# Patient Record
Sex: MALE | Race: WHITE | Employment: FULL TIME | URBAN - METROPOLITAN AREA
[De-identification: names, ages, dates, MRNs, and addresses within clinical notes are randomized per-mention and may not be internally consistent; named-entity substitution may affect disease eponyms.]

---

## 2017-03-23 ENCOUNTER — RECORDS - HEALTHEAST (OUTPATIENT)
Dept: LAB | Facility: CLINIC | Age: 57
End: 2017-03-23

## 2017-03-23 LAB
CHOLEST SERPL-MCNC: 141 MG/DL
FASTING STATUS PATIENT QL REPORTED: ABNORMAL
HDLC SERPL-MCNC: 38 MG/DL
LDLC SERPL CALC-MCNC: 82 MG/DL
PSA SERPL-MCNC: 0.8 NG/ML (ref 0–3.5)
TRIGL SERPL-MCNC: 103 MG/DL

## 2018-03-19 ENCOUNTER — SURGERY - HEALTHEAST (OUTPATIENT)
Dept: CARDIOLOGY | Facility: CLINIC | Age: 58
End: 2018-03-19

## 2018-03-19 ASSESSMENT — MIFFLIN-ST. JEOR: SCORE: 2014.21

## 2018-03-20 ASSESSMENT — MIFFLIN-ST. JEOR: SCORE: 2035.98

## 2018-03-21 ASSESSMENT — MIFFLIN-ST. JEOR: SCORE: 1995.61

## 2018-03-22 ASSESSMENT — MIFFLIN-ST. JEOR: SCORE: 1986.54

## 2018-03-26 ENCOUNTER — RECORDS - HEALTHEAST (OUTPATIENT)
Dept: LAB | Facility: CLINIC | Age: 58
End: 2018-03-26

## 2018-03-26 LAB
ANION GAP SERPL CALCULATED.3IONS-SCNC: 14 MMOL/L (ref 5–18)
BUN SERPL-MCNC: 13 MG/DL (ref 8–22)
CALCIUM SERPL-MCNC: 9.3 MG/DL (ref 8.5–10.5)
CHLORIDE BLD-SCNC: 102 MMOL/L (ref 98–107)
CO2 SERPL-SCNC: 20 MMOL/L (ref 22–31)
CREAT SERPL-MCNC: 0.77 MG/DL (ref 0.7–1.3)
GFR SERPL CREATININE-BSD FRML MDRD: >60 ML/MIN/1.73M2
GLUCOSE BLD-MCNC: 123 MG/DL (ref 70–125)
POTASSIUM BLD-SCNC: 4.6 MMOL/L (ref 3.5–5)
SODIUM SERPL-SCNC: 136 MMOL/L (ref 136–145)

## 2018-04-02 ENCOUNTER — RECORDS - HEALTHEAST (OUTPATIENT)
Dept: ADMINISTRATIVE | Facility: OTHER | Age: 58
End: 2018-04-02

## 2018-04-02 ENCOUNTER — AMBULATORY - HEALTHEAST (OUTPATIENT)
Dept: CARDIOLOGY | Facility: CLINIC | Age: 58
End: 2018-04-02

## 2018-04-04 ENCOUNTER — OFFICE VISIT - HEALTHEAST (OUTPATIENT)
Dept: CARDIOLOGY | Facility: CLINIC | Age: 58
End: 2018-04-04

## 2018-04-04 ENCOUNTER — COMMUNICATION - HEALTHEAST (OUTPATIENT)
Dept: CARDIOLOGY | Facility: CLINIC | Age: 58
End: 2018-04-04

## 2018-04-04 DIAGNOSIS — E66.9 OBESITY (BMI 30-39.9): ICD-10-CM

## 2018-04-04 DIAGNOSIS — R73.9 HYPERGLYCEMIA: ICD-10-CM

## 2018-04-04 DIAGNOSIS — Z95.1 S/P CABG X 4: ICD-10-CM

## 2018-04-04 DIAGNOSIS — I21.19 ACUTE MI, INFERIOR WALL, INITIAL EPISODE OF CARE (H): ICD-10-CM

## 2018-04-04 DIAGNOSIS — E78.5 DYSLIPIDEMIA, GOAL LDL BELOW 70: ICD-10-CM

## 2018-04-04 RX ORDER — CYCLOBENZAPRINE HCL 10 MG
10 TABLET ORAL 3 TIMES DAILY PRN
Refills: 0 | Status: SHIPPED | COMMUNITY
Start: 2018-03-29

## 2018-04-04 RX ORDER — METOPROLOL SUCCINATE 50 MG/1
50 TABLET, EXTENDED RELEASE ORAL DAILY
Qty: 30 TABLET | Refills: 0 | Status: SHIPPED
Start: 2018-04-04 | End: 2018-05-04

## 2018-04-04 ASSESSMENT — MIFFLIN-ST. JEOR: SCORE: 1977.92

## 2018-04-05 ENCOUNTER — AMBULATORY - HEALTHEAST (OUTPATIENT)
Dept: CARDIAC REHAB | Facility: HOSPITAL | Age: 58
End: 2018-04-05

## 2018-04-05 DIAGNOSIS — I21.3 STEMI (ST ELEVATION MYOCARDIAL INFARCTION) (H): ICD-10-CM

## 2018-04-05 DIAGNOSIS — Z95.5 STENTED CORONARY ARTERY: ICD-10-CM

## 2018-04-06 ENCOUNTER — AMBULATORY - HEALTHEAST (OUTPATIENT)
Dept: CARDIAC REHAB | Facility: HOSPITAL | Age: 58
End: 2018-04-06

## 2018-04-06 DIAGNOSIS — Z95.5 STENTED CORONARY ARTERY: ICD-10-CM

## 2018-04-06 DIAGNOSIS — I21.11 ST ELEVATION MYOCARDIAL INFARCTION INVOLVING RIGHT CORONARY ARTERY (H): ICD-10-CM

## 2018-04-09 ENCOUNTER — AMBULATORY - HEALTHEAST (OUTPATIENT)
Dept: CARDIAC REHAB | Facility: HOSPITAL | Age: 58
End: 2018-04-09

## 2018-04-09 DIAGNOSIS — I21.11 ST ELEVATION MYOCARDIAL INFARCTION INVOLVING RIGHT CORONARY ARTERY (H): ICD-10-CM

## 2018-04-09 DIAGNOSIS — Z95.5 STENTED CORONARY ARTERY: ICD-10-CM

## 2018-04-10 ENCOUNTER — COMMUNICATION - HEALTHEAST (OUTPATIENT)
Dept: CARDIOLOGY | Facility: CLINIC | Age: 58
End: 2018-04-10

## 2018-04-11 ENCOUNTER — AMBULATORY - HEALTHEAST (OUTPATIENT)
Dept: CARDIAC REHAB | Facility: HOSPITAL | Age: 58
End: 2018-04-11

## 2018-04-11 ENCOUNTER — COMMUNICATION - HEALTHEAST (OUTPATIENT)
Dept: CARDIOLOGY | Facility: CLINIC | Age: 58
End: 2018-04-11

## 2018-04-11 DIAGNOSIS — Z95.5 STENTED CORONARY ARTERY: ICD-10-CM

## 2018-04-11 DIAGNOSIS — I21.11 ST ELEVATION MYOCARDIAL INFARCTION INVOLVING RIGHT CORONARY ARTERY (H): ICD-10-CM

## 2018-04-13 ENCOUNTER — AMBULATORY - HEALTHEAST (OUTPATIENT)
Dept: CARDIAC REHAB | Facility: HOSPITAL | Age: 58
End: 2018-04-13

## 2018-04-13 DIAGNOSIS — I21.11 ST ELEVATION MYOCARDIAL INFARCTION INVOLVING RIGHT CORONARY ARTERY (H): ICD-10-CM

## 2018-04-13 DIAGNOSIS — Z95.5 STENTED CORONARY ARTERY: ICD-10-CM

## 2018-04-16 ENCOUNTER — AMBULATORY - HEALTHEAST (OUTPATIENT)
Dept: CARDIAC REHAB | Facility: HOSPITAL | Age: 58
End: 2018-04-16

## 2018-04-16 DIAGNOSIS — Z95.5 STENTED CORONARY ARTERY: ICD-10-CM

## 2018-04-18 ENCOUNTER — AMBULATORY - HEALTHEAST (OUTPATIENT)
Dept: CARDIAC REHAB | Facility: HOSPITAL | Age: 58
End: 2018-04-18

## 2018-04-18 DIAGNOSIS — Z95.5 STENTED CORONARY ARTERY: ICD-10-CM

## 2018-04-18 DIAGNOSIS — I21.11 ST ELEVATION MYOCARDIAL INFARCTION INVOLVING RIGHT CORONARY ARTERY (H): ICD-10-CM

## 2018-04-19 ENCOUNTER — AMBULATORY - HEALTHEAST (OUTPATIENT)
Dept: CARDIOLOGY | Facility: CLINIC | Age: 58
End: 2018-04-19

## 2018-04-19 ENCOUNTER — RECORDS - HEALTHEAST (OUTPATIENT)
Dept: ADMINISTRATIVE | Facility: OTHER | Age: 58
End: 2018-04-19

## 2018-04-20 ENCOUNTER — AMBULATORY - HEALTHEAST (OUTPATIENT)
Dept: CARDIOLOGY | Facility: CLINIC | Age: 58
End: 2018-04-20

## 2018-04-20 ENCOUNTER — COMMUNICATION - HEALTHEAST (OUTPATIENT)
Dept: CARDIOLOGY | Facility: CLINIC | Age: 58
End: 2018-04-20

## 2018-04-20 ENCOUNTER — OFFICE VISIT - HEALTHEAST (OUTPATIENT)
Dept: CARDIOLOGY | Facility: CLINIC | Age: 58
End: 2018-04-20

## 2018-04-20 DIAGNOSIS — E78.5 DYSLIPIDEMIA, GOAL LDL BELOW 70: ICD-10-CM

## 2018-04-20 DIAGNOSIS — I25.10 CAD (CORONARY ARTERY DISEASE): ICD-10-CM

## 2018-04-20 DIAGNOSIS — Z95.1 S/P CABG X 4: ICD-10-CM

## 2018-04-20 LAB
ATRIAL RATE - MUSE: 81 BPM
DIASTOLIC BLOOD PRESSURE - MUSE: NORMAL MMHG
INTERPRETATION ECG - MUSE: NORMAL
P AXIS - MUSE: 16 DEGREES
PR INTERVAL - MUSE: 144 MS
QRS DURATION - MUSE: 90 MS
QT - MUSE: 360 MS
QTC - MUSE: 418 MS
R AXIS - MUSE: 2 DEGREES
SYSTOLIC BLOOD PRESSURE - MUSE: NORMAL MMHG
T AXIS - MUSE: 65 DEGREES
VENTRICULAR RATE- MUSE: 81 BPM

## 2018-04-20 ASSESSMENT — MIFFLIN-ST. JEOR: SCORE: 1939.82

## 2018-04-23 ENCOUNTER — AMBULATORY - HEALTHEAST (OUTPATIENT)
Dept: CARDIAC REHAB | Facility: HOSPITAL | Age: 58
End: 2018-04-23

## 2018-04-23 DIAGNOSIS — Z95.5 STENTED CORONARY ARTERY: ICD-10-CM

## 2018-04-23 DIAGNOSIS — I21.11 ST ELEVATION MYOCARDIAL INFARCTION INVOLVING RIGHT CORONARY ARTERY (H): ICD-10-CM

## 2018-04-25 ENCOUNTER — COMMUNICATION - HEALTHEAST (OUTPATIENT)
Dept: CARDIOLOGY | Facility: CLINIC | Age: 58
End: 2018-04-25

## 2018-04-25 ENCOUNTER — AMBULATORY - HEALTHEAST (OUTPATIENT)
Dept: CARDIAC REHAB | Facility: HOSPITAL | Age: 58
End: 2018-04-25

## 2018-04-25 DIAGNOSIS — Z95.5 STENTED CORONARY ARTERY: ICD-10-CM

## 2018-04-27 ENCOUNTER — AMBULATORY - HEALTHEAST (OUTPATIENT)
Dept: CARDIAC REHAB | Facility: HOSPITAL | Age: 58
End: 2018-04-27

## 2018-04-27 DIAGNOSIS — I21.11 ST ELEVATION MYOCARDIAL INFARCTION INVOLVING RIGHT CORONARY ARTERY (H): ICD-10-CM

## 2018-04-27 DIAGNOSIS — Z95.5 STENTED CORONARY ARTERY: ICD-10-CM

## 2018-04-30 ENCOUNTER — AMBULATORY - HEALTHEAST (OUTPATIENT)
Dept: CARDIAC REHAB | Facility: HOSPITAL | Age: 58
End: 2018-04-30

## 2018-04-30 DIAGNOSIS — Z95.5 STENTED CORONARY ARTERY: ICD-10-CM

## 2018-04-30 DIAGNOSIS — I21.11 ST ELEVATION MYOCARDIAL INFARCTION INVOLVING RIGHT CORONARY ARTERY (H): ICD-10-CM

## 2018-05-02 ENCOUNTER — AMBULATORY - HEALTHEAST (OUTPATIENT)
Dept: CARDIAC REHAB | Facility: HOSPITAL | Age: 58
End: 2018-05-02

## 2018-05-02 DIAGNOSIS — Z95.5 STENTED CORONARY ARTERY: ICD-10-CM

## 2018-05-02 DIAGNOSIS — I21.11 ST ELEVATION MYOCARDIAL INFARCTION INVOLVING RIGHT CORONARY ARTERY (H): ICD-10-CM

## 2018-05-04 ENCOUNTER — AMBULATORY - HEALTHEAST (OUTPATIENT)
Dept: CARDIAC REHAB | Facility: HOSPITAL | Age: 58
End: 2018-05-04

## 2018-05-04 DIAGNOSIS — I21.11 ST ELEVATION MYOCARDIAL INFARCTION INVOLVING RIGHT CORONARY ARTERY (H): ICD-10-CM

## 2018-05-04 DIAGNOSIS — Z95.5 STENTED CORONARY ARTERY: ICD-10-CM

## 2018-05-07 ENCOUNTER — AMBULATORY - HEALTHEAST (OUTPATIENT)
Dept: CARDIAC REHAB | Facility: HOSPITAL | Age: 58
End: 2018-05-07

## 2018-05-07 DIAGNOSIS — I21.11 ST ELEVATION MYOCARDIAL INFARCTION INVOLVING RIGHT CORONARY ARTERY (H): ICD-10-CM

## 2018-05-07 DIAGNOSIS — Z95.5 STENTED CORONARY ARTERY: ICD-10-CM

## 2018-05-09 ENCOUNTER — AMBULATORY - HEALTHEAST (OUTPATIENT)
Dept: CARDIAC REHAB | Facility: HOSPITAL | Age: 58
End: 2018-05-09

## 2018-05-09 DIAGNOSIS — Z95.5 STENTED CORONARY ARTERY: ICD-10-CM

## 2018-05-09 DIAGNOSIS — I21.11 ST ELEVATION MYOCARDIAL INFARCTION INVOLVING RIGHT CORONARY ARTERY (H): ICD-10-CM

## 2018-05-11 ENCOUNTER — AMBULATORY - HEALTHEAST (OUTPATIENT)
Dept: CARDIAC REHAB | Facility: HOSPITAL | Age: 58
End: 2018-05-11

## 2018-05-11 DIAGNOSIS — I21.11 ST ELEVATION MYOCARDIAL INFARCTION INVOLVING RIGHT CORONARY ARTERY (H): ICD-10-CM

## 2018-05-11 DIAGNOSIS — Z95.5 STENTED CORONARY ARTERY: ICD-10-CM

## 2018-05-29 ENCOUNTER — RECORDS - HEALTHEAST (OUTPATIENT)
Dept: ADMINISTRATIVE | Facility: OTHER | Age: 58
End: 2018-05-29

## 2018-05-30 ENCOUNTER — OFFICE VISIT - HEALTHEAST (OUTPATIENT)
Dept: CARDIOLOGY | Facility: CLINIC | Age: 58
End: 2018-05-30

## 2018-05-30 DIAGNOSIS — I21.19 ACUTE MI, INFERIOR WALL, INITIAL EPISODE OF CARE (H): ICD-10-CM

## 2018-05-30 DIAGNOSIS — I21.11 ST ELEVATION MYOCARDIAL INFARCTION INVOLVING RIGHT CORONARY ARTERY (H): ICD-10-CM

## 2018-05-30 DIAGNOSIS — E78.5 DYSLIPIDEMIA, GOAL LDL BELOW 70: ICD-10-CM

## 2018-05-30 DIAGNOSIS — I25.709 CORONARY ARTERY DISEASE INVOLVING CORONARY BYPASS GRAFT OF NATIVE HEART WITH ANGINA PECTORIS (H): ICD-10-CM

## 2018-05-30 DIAGNOSIS — Z95.1 S/P CABG X 4: ICD-10-CM

## 2018-05-30 ASSESSMENT — MIFFLIN-ST. JEOR: SCORE: 1909.88

## 2018-05-31 ENCOUNTER — COMMUNICATION - HEALTHEAST (OUTPATIENT)
Dept: CARDIOLOGY | Facility: CLINIC | Age: 58
End: 2018-05-31

## 2018-06-06 ENCOUNTER — AMBULATORY - HEALTHEAST (OUTPATIENT)
Dept: CARDIOLOGY | Facility: CLINIC | Age: 58
End: 2018-06-06

## 2018-06-06 DIAGNOSIS — I21.19 ACUTE MI, INFERIOR WALL, INITIAL EPISODE OF CARE (H): ICD-10-CM

## 2018-06-06 LAB
ALT SERPL W P-5'-P-CCNC: 28 U/L (ref 0–45)
ANION GAP SERPL CALCULATED.3IONS-SCNC: 11 MMOL/L (ref 5–18)
AST SERPL W P-5'-P-CCNC: 22 U/L (ref 0–40)
BUN SERPL-MCNC: 17 MG/DL (ref 8–22)
CALCIUM SERPL-MCNC: 9.3 MG/DL (ref 8.5–10.5)
CHLORIDE BLD-SCNC: 106 MMOL/L (ref 98–107)
CO2 SERPL-SCNC: 23 MMOL/L (ref 22–31)
CREAT SERPL-MCNC: 0.66 MG/DL (ref 0.7–1.3)
GFR SERPL CREATININE-BSD FRML MDRD: >60 ML/MIN/1.73M2
GLUCOSE BLD-MCNC: 126 MG/DL (ref 70–125)
POTASSIUM BLD-SCNC: 4.4 MMOL/L (ref 3.5–5)
SODIUM SERPL-SCNC: 140 MMOL/L (ref 136–145)

## 2018-08-27 ENCOUNTER — RECORDS - HEALTHEAST (OUTPATIENT)
Dept: ADMINISTRATIVE | Facility: OTHER | Age: 58
End: 2018-08-27

## 2018-08-27 ENCOUNTER — AMBULATORY - HEALTHEAST (OUTPATIENT)
Dept: CARDIOLOGY | Facility: CLINIC | Age: 58
End: 2018-08-27

## 2018-08-31 ENCOUNTER — OFFICE VISIT - HEALTHEAST (OUTPATIENT)
Dept: CARDIOLOGY | Facility: CLINIC | Age: 58
End: 2018-08-31

## 2018-08-31 DIAGNOSIS — Z95.1 S/P CABG X 4: ICD-10-CM

## 2018-08-31 DIAGNOSIS — I25.10 CAD (CORONARY ARTERY DISEASE): ICD-10-CM

## 2018-08-31 DIAGNOSIS — E78.5 DYSLIPIDEMIA, GOAL LDL BELOW 70: ICD-10-CM

## 2018-08-31 DIAGNOSIS — I25.709 CORONARY ARTERY DISEASE INVOLVING CORONARY BYPASS GRAFT OF NATIVE HEART WITH ANGINA PECTORIS (H): ICD-10-CM

## 2018-08-31 ASSESSMENT — MIFFLIN-ST. JEOR: SCORE: 1941.07

## 2018-09-17 ENCOUNTER — RECORDS - HEALTHEAST (OUTPATIENT)
Dept: LAB | Facility: CLINIC | Age: 58
End: 2018-09-17

## 2018-09-17 LAB
ANION GAP SERPL CALCULATED.3IONS-SCNC: 11 MMOL/L (ref 5–18)
BUN SERPL-MCNC: 21 MG/DL (ref 8–22)
CALCIUM SERPL-MCNC: 9.4 MG/DL (ref 8.5–10.5)
CHLORIDE BLD-SCNC: 106 MMOL/L (ref 98–107)
CO2 SERPL-SCNC: 23 MMOL/L (ref 22–31)
CREAT SERPL-MCNC: 0.71 MG/DL (ref 0.7–1.3)
GFR SERPL CREATININE-BSD FRML MDRD: >60 ML/MIN/1.73M2
GLUCOSE BLD-MCNC: 122 MG/DL (ref 70–125)
POTASSIUM BLD-SCNC: 4.5 MMOL/L (ref 3.5–5)
PSA SERPL-MCNC: 0.4 NG/ML (ref 0–3.5)
SODIUM SERPL-SCNC: 140 MMOL/L (ref 136–145)

## 2018-09-18 ENCOUNTER — AMBULATORY - HEALTHEAST (OUTPATIENT)
Dept: CARDIOLOGY | Facility: CLINIC | Age: 58
End: 2018-09-18

## 2018-09-20 ENCOUNTER — COMMUNICATION - HEALTHEAST (OUTPATIENT)
Dept: CARDIOLOGY | Facility: CLINIC | Age: 58
End: 2018-09-20

## 2018-09-20 DIAGNOSIS — I25.10 CAD (CORONARY ARTERY DISEASE): ICD-10-CM

## 2018-09-21 ENCOUNTER — HOSPITAL ENCOUNTER (OUTPATIENT)
Dept: CARDIOLOGY | Facility: HOSPITAL | Age: 58
Discharge: HOME | End: 2018-09-21
Attending: INTERNAL MEDICINE

## 2018-09-21 DIAGNOSIS — I25.10 CAD (CORONARY ARTERY DISEASE): ICD-10-CM

## 2018-09-21 LAB
CV STRESS CURRENT BP HE: NORMAL
CV STRESS CURRENT HR HE: 101
CV STRESS CURRENT HR HE: 105
CV STRESS CURRENT HR HE: 105
CV STRESS CURRENT HR HE: 57
CV STRESS CURRENT HR HE: 58
CV STRESS CURRENT HR HE: 59
CV STRESS CURRENT HR HE: 60
CV STRESS CURRENT HR HE: 62
CV STRESS CURRENT HR HE: 62
CV STRESS CURRENT HR HE: 65
CV STRESS CURRENT HR HE: 70
CV STRESS CURRENT HR HE: 72
CV STRESS CURRENT HR HE: 73
CV STRESS CURRENT HR HE: 76
CV STRESS CURRENT HR HE: 77
CV STRESS CURRENT HR HE: 80
CV STRESS CURRENT HR HE: 82
CV STRESS CURRENT HR HE: 83
CV STRESS CURRENT HR HE: 85
CV STRESS CURRENT HR HE: 87
CV STRESS CURRENT HR HE: 95
CV STRESS CURRENT HR HE: 97
CV STRESS DEVIATION TIME HE: NORMAL
CV STRESS ECHO PERCENT HR HE: NORMAL
CV STRESS EXERCISE STAGE HE: NORMAL
CV STRESS FINAL RESTING BP HE: NORMAL
CV STRESS FINAL RESTING HR HE: 60
CV STRESS MAX HR HE: 107
CV STRESS MAX TREADMILL GRADE HE: 14
CV STRESS MAX TREADMILL SPEED HE: 3.4
CV STRESS PEAK DIA BP HE: NORMAL
CV STRESS PEAK SYS BP HE: NORMAL
CV STRESS PHASE HE: NORMAL
CV STRESS PROTOCOL HE: NORMAL
CV STRESS RESTING PT POSITION HE: NORMAL
CV STRESS RESTING PT POSITION HE: NORMAL
CV STRESS ST DEVIATION AMOUNT HE: NORMAL
CV STRESS ST DEVIATION ELEVATION HE: NORMAL
CV STRESS ST EVELATION AMOUNT HE: NORMAL
CV STRESS TEST TYPE HE: NORMAL
CV STRESS TOTAL STAGE TIME MIN 1 HE: NORMAL
ECHO EJECTION FRACTION ESTIMATED: 55 %
STRESS ECHO BASELINE BP: NORMAL
STRESS ECHO BASELINE HR: 70
STRESS ECHO CALCULATED PERCENT HR: 65 %
STRESS ECHO LAST STRESS BP: NORMAL
STRESS ECHO LAST STRESS HR: 105
STRESS ECHO POST ESTIMATED WORKLOAD: 8.1
STRESS ECHO POST EXERCISE DUR MIN: 6
STRESS ECHO POST EXERCISE DUR SEC: 46
STRESS ECHO TARGET HR: 139

## 2018-09-25 ENCOUNTER — AMBULATORY - HEALTHEAST (OUTPATIENT)
Dept: CARDIOLOGY | Facility: CLINIC | Age: 58
End: 2018-09-25

## 2018-09-25 DIAGNOSIS — I25.10 CAD (CORONARY ARTERY DISEASE): ICD-10-CM

## 2018-09-25 LAB
ALBUMIN SERPL-MCNC: 4.2 G/DL (ref 3.5–5)
ALP SERPL-CCNC: 69 U/L (ref 45–120)
ALT SERPL W P-5'-P-CCNC: 26 U/L (ref 0–45)
AST SERPL W P-5'-P-CCNC: 20 U/L (ref 0–40)
BILIRUB DIRECT SERPL-MCNC: 0.2 MG/DL
BILIRUB SERPL-MCNC: 0.8 MG/DL (ref 0–1)
CHOLEST SERPL-MCNC: 160 MG/DL
FASTING STATUS PATIENT QL REPORTED: YES
HDLC SERPL-MCNC: 41 MG/DL
LDLC SERPL CALC-MCNC: 88 MG/DL
PROT SERPL-MCNC: 7.7 G/DL (ref 6–8)
TRIGL SERPL-MCNC: 153 MG/DL

## 2018-10-15 ENCOUNTER — COMMUNICATION - HEALTHEAST (OUTPATIENT)
Dept: CARDIOLOGY | Facility: CLINIC | Age: 58
End: 2018-10-15

## 2018-11-15 ENCOUNTER — COMMUNICATION - HEALTHEAST (OUTPATIENT)
Dept: CARDIOLOGY | Facility: CLINIC | Age: 58
End: 2018-11-15

## 2018-11-15 DIAGNOSIS — E78.5 DYSLIPIDEMIA, GOAL LDL BELOW 70: ICD-10-CM

## 2019-01-07 ENCOUNTER — AMBULATORY - HEALTHEAST (OUTPATIENT)
Dept: CARDIOLOGY | Facility: CLINIC | Age: 59
End: 2019-01-07

## 2019-01-07 ENCOUNTER — RECORDS - HEALTHEAST (OUTPATIENT)
Dept: ADMINISTRATIVE | Facility: OTHER | Age: 59
End: 2019-01-07

## 2019-01-07 ENCOUNTER — COMMUNICATION - HEALTHEAST (OUTPATIENT)
Dept: CARDIOLOGY | Facility: CLINIC | Age: 59
End: 2019-01-07

## 2019-01-07 DIAGNOSIS — E78.5 DYSLIPIDEMIA, GOAL LDL BELOW 70: ICD-10-CM

## 2019-01-07 DIAGNOSIS — I25.709 CORONARY ARTERY DISEASE INVOLVING CORONARY BYPASS GRAFT OF NATIVE HEART WITH ANGINA PECTORIS (H): ICD-10-CM

## 2019-01-08 ENCOUNTER — RECORDS - HEALTHEAST (OUTPATIENT)
Dept: LAB | Facility: CLINIC | Age: 59
End: 2019-01-08

## 2019-01-08 LAB
ALBUMIN SERPL-MCNC: 4.1 G/DL (ref 3.5–5)
ALP SERPL-CCNC: 66 U/L (ref 45–120)
ALT SERPL W P-5'-P-CCNC: 39 U/L (ref 0–45)
ANION GAP SERPL CALCULATED.3IONS-SCNC: 9 MMOL/L (ref 5–18)
AST SERPL W P-5'-P-CCNC: 30 U/L (ref 0–40)
BILIRUB SERPL-MCNC: 0.7 MG/DL (ref 0–1)
BUN SERPL-MCNC: 20 MG/DL (ref 8–22)
CALCIUM SERPL-MCNC: 8.9 MG/DL (ref 8.5–10.5)
CHLORIDE BLD-SCNC: 107 MMOL/L (ref 98–107)
CHOLEST SERPL-MCNC: 150 MG/DL
CO2 SERPL-SCNC: 24 MMOL/L (ref 22–31)
CREAT SERPL-MCNC: 0.67 MG/DL (ref 0.7–1.3)
FASTING STATUS PATIENT QL REPORTED: YES
GFR SERPL CREATININE-BSD FRML MDRD: >60 ML/MIN/1.73M2
GLUCOSE BLD-MCNC: 123 MG/DL (ref 70–125)
HDLC SERPL-MCNC: 37 MG/DL
LDLC SERPL CALC-MCNC: 81 MG/DL
POTASSIUM BLD-SCNC: 4.2 MMOL/L (ref 3.5–5)
PROT SERPL-MCNC: 7.4 G/DL (ref 6–8)
SODIUM SERPL-SCNC: 140 MMOL/L (ref 136–145)
TRIGL SERPL-MCNC: 160 MG/DL

## 2019-01-10 ENCOUNTER — AMBULATORY - HEALTHEAST (OUTPATIENT)
Dept: CARDIOLOGY | Facility: CLINIC | Age: 59
End: 2019-01-10

## 2019-01-11 ENCOUNTER — RECORDS - HEALTHEAST (OUTPATIENT)
Dept: LAB | Facility: CLINIC | Age: 59
End: 2019-01-11

## 2019-01-11 ENCOUNTER — OFFICE VISIT - HEALTHEAST (OUTPATIENT)
Dept: CARDIOLOGY | Facility: CLINIC | Age: 59
End: 2019-01-11

## 2019-01-11 DIAGNOSIS — Z95.1 S/P CABG X 4: ICD-10-CM

## 2019-01-11 DIAGNOSIS — I21.3 ST ELEVATION (STEMI) MYOCARDIAL INFARCTION (H): ICD-10-CM

## 2019-01-11 DIAGNOSIS — E78.5 DYSLIPIDEMIA, GOAL LDL BELOW 70: ICD-10-CM

## 2019-01-11 DIAGNOSIS — I25.709 CORONARY ARTERY DISEASE INVOLVING CORONARY BYPASS GRAFT OF NATIVE HEART WITH ANGINA PECTORIS (H): ICD-10-CM

## 2019-01-11 ASSESSMENT — MIFFLIN-ST. JEOR: SCORE: 1959.21

## 2019-01-13 LAB — BACTERIA SPEC CULT: NORMAL

## 2019-01-14 ENCOUNTER — COMMUNICATION - HEALTHEAST (OUTPATIENT)
Dept: CARDIOLOGY | Facility: CLINIC | Age: 59
End: 2019-01-14

## 2019-01-14 DIAGNOSIS — E78.5 DYSLIPIDEMIA, GOAL LDL BELOW 70: ICD-10-CM

## 2019-01-14 DIAGNOSIS — I25.709 CORONARY ARTERY DISEASE INVOLVING CORONARY BYPASS GRAFT OF NATIVE HEART WITH ANGINA PECTORIS (H): ICD-10-CM

## 2019-03-20 ENCOUNTER — COMMUNICATION - HEALTHEAST (OUTPATIENT)
Dept: CARDIOLOGY | Facility: CLINIC | Age: 59
End: 2019-03-20

## 2019-04-05 ENCOUNTER — AMBULATORY - HEALTHEAST (OUTPATIENT)
Dept: CARDIOLOGY | Facility: CLINIC | Age: 59
End: 2019-04-05

## 2019-04-05 DIAGNOSIS — I25.709 CORONARY ARTERY DISEASE INVOLVING CORONARY BYPASS GRAFT OF NATIVE HEART WITH ANGINA PECTORIS (H): ICD-10-CM

## 2019-04-05 DIAGNOSIS — E78.5 DYSLIPIDEMIA, GOAL LDL BELOW 70: ICD-10-CM

## 2019-04-05 LAB
ALT SERPL W P-5'-P-CCNC: 50 U/L (ref 0–45)
AST SERPL W P-5'-P-CCNC: 46 U/L (ref 0–40)
CHOLEST SERPL-MCNC: 160 MG/DL
FASTING STATUS PATIENT QL REPORTED: YES
HDLC SERPL-MCNC: 47 MG/DL
LDLC SERPL CALC-MCNC: 77 MG/DL
TRIGL SERPL-MCNC: 178 MG/DL

## 2019-04-11 ENCOUNTER — AMBULATORY - HEALTHEAST (OUTPATIENT)
Dept: CARDIOLOGY | Facility: CLINIC | Age: 59
End: 2019-04-11

## 2019-04-11 DIAGNOSIS — Z51.81 ENCOUNTER FOR MEDICATION MONITORING: ICD-10-CM

## 2019-04-11 DIAGNOSIS — E78.5 DYSLIPIDEMIA, GOAL LDL BELOW 70: ICD-10-CM

## 2019-05-17 ENCOUNTER — AMBULATORY - HEALTHEAST (OUTPATIENT)
Dept: CARDIOLOGY | Facility: CLINIC | Age: 59
End: 2019-05-17

## 2019-05-17 DIAGNOSIS — Z51.81 ENCOUNTER FOR MEDICATION MONITORING: ICD-10-CM

## 2019-05-17 DIAGNOSIS — E78.5 DYSLIPIDEMIA, GOAL LDL BELOW 70: ICD-10-CM

## 2019-05-17 LAB
ALT SERPL W P-5'-P-CCNC: 37 U/L (ref 0–45)
AST SERPL W P-5'-P-CCNC: 29 U/L (ref 0–40)

## 2019-05-21 ENCOUNTER — COMMUNICATION - HEALTHEAST (OUTPATIENT)
Dept: CARDIOLOGY | Facility: CLINIC | Age: 59
End: 2019-05-21

## 2019-08-30 ENCOUNTER — COMMUNICATION - HEALTHEAST (OUTPATIENT)
Dept: CARDIOLOGY | Facility: CLINIC | Age: 59
End: 2019-08-30

## 2019-08-30 DIAGNOSIS — I21.19 ACUTE MI, INFERIOR WALL, INITIAL EPISODE OF CARE (H): ICD-10-CM

## 2020-01-29 ENCOUNTER — COMMUNICATION - HEALTHEAST (OUTPATIENT)
Dept: CARDIOLOGY | Facility: CLINIC | Age: 60
End: 2020-01-29

## 2020-01-29 DIAGNOSIS — E78.5 DYSLIPIDEMIA, GOAL LDL BELOW 70: ICD-10-CM

## 2020-01-29 DIAGNOSIS — I25.709 CORONARY ARTERY DISEASE INVOLVING CORONARY BYPASS GRAFT OF NATIVE HEART WITH ANGINA PECTORIS (H): ICD-10-CM

## 2020-01-30 ENCOUNTER — COMMUNICATION - HEALTHEAST (OUTPATIENT)
Dept: CARDIOLOGY | Facility: CLINIC | Age: 60
End: 2020-01-30

## 2020-01-30 DIAGNOSIS — E78.5 DYSLIPIDEMIA, GOAL LDL BELOW 70: ICD-10-CM

## 2020-01-30 DIAGNOSIS — I25.709 CORONARY ARTERY DISEASE INVOLVING CORONARY BYPASS GRAFT OF NATIVE HEART WITH ANGINA PECTORIS (H): ICD-10-CM

## 2020-03-07 ENCOUNTER — COMMUNICATION - HEALTHEAST (OUTPATIENT)
Dept: CARDIOLOGY | Facility: CLINIC | Age: 60
End: 2020-03-07

## 2020-03-07 DIAGNOSIS — I21.19 ACUTE MI, INFERIOR WALL, INITIAL EPISODE OF CARE (H): ICD-10-CM

## 2020-03-11 ENCOUNTER — RECORDS - HEALTHEAST (OUTPATIENT)
Dept: ADMINISTRATIVE | Facility: OTHER | Age: 60
End: 2020-03-11

## 2020-03-11 ENCOUNTER — AMBULATORY - HEALTHEAST (OUTPATIENT)
Dept: CARDIOLOGY | Facility: CLINIC | Age: 60
End: 2020-03-11

## 2020-03-16 ENCOUNTER — OFFICE VISIT - HEALTHEAST (OUTPATIENT)
Dept: CARDIOLOGY | Facility: CLINIC | Age: 60
End: 2020-03-16

## 2020-03-16 DIAGNOSIS — I25.10 CAD (CORONARY ARTERY DISEASE): ICD-10-CM

## 2020-03-16 DIAGNOSIS — I25.709 CORONARY ARTERY DISEASE INVOLVING CORONARY BYPASS GRAFT OF NATIVE HEART WITH ANGINA PECTORIS (H): ICD-10-CM

## 2020-03-16 DIAGNOSIS — E78.5 DYSLIPIDEMIA, GOAL LDL BELOW 70: ICD-10-CM

## 2020-03-16 LAB
ALBUMIN SERPL-MCNC: 4.1 G/DL (ref 3.5–5)
ALP SERPL-CCNC: 80 U/L (ref 45–120)
ALT SERPL W P-5'-P-CCNC: 44 U/L (ref 0–45)
ANION GAP SERPL CALCULATED.3IONS-SCNC: 11 MMOL/L (ref 5–18)
AST SERPL W P-5'-P-CCNC: 31 U/L (ref 0–40)
BILIRUB SERPL-MCNC: 1 MG/DL (ref 0–1)
BUN SERPL-MCNC: 12 MG/DL (ref 8–22)
CALCIUM SERPL-MCNC: 9.2 MG/DL (ref 8.5–10.5)
CHLORIDE BLD-SCNC: 99 MMOL/L (ref 98–107)
CHOLEST SERPL-MCNC: 160 MG/DL
CO2 SERPL-SCNC: 25 MMOL/L (ref 22–31)
CREAT SERPL-MCNC: 0.8 MG/DL (ref 0.7–1.3)
FASTING STATUS PATIENT QL REPORTED: YES
GFR SERPL CREATININE-BSD FRML MDRD: >60 ML/MIN/1.73M2
GLUCOSE BLD-MCNC: 334 MG/DL (ref 70–125)
HBA1C MFR BLD: 13 %
HDLC SERPL-MCNC: 41 MG/DL
LDLC SERPL CALC-MCNC: 71 MG/DL
POTASSIUM BLD-SCNC: 4.7 MMOL/L (ref 3.5–5)
PROT SERPL-MCNC: 7.2 G/DL (ref 6–8)
SODIUM SERPL-SCNC: 135 MMOL/L (ref 136–145)
TRIGL SERPL-MCNC: 242 MG/DL

## 2020-03-16 RX ORDER — ASPIRIN 325 MG
325 TABLET ORAL DAILY
Status: SHIPPED | COMMUNITY
Start: 2020-03-16

## 2020-03-16 ASSESSMENT — MIFFLIN-ST. JEOR: SCORE: 1900.25

## 2020-03-19 ENCOUNTER — COMMUNICATION - HEALTHEAST (OUTPATIENT)
Dept: CARDIOLOGY | Facility: CLINIC | Age: 60
End: 2020-03-19

## 2020-04-26 ENCOUNTER — COMMUNICATION - HEALTHEAST (OUTPATIENT)
Dept: CARDIOLOGY | Facility: CLINIC | Age: 60
End: 2020-04-26

## 2020-04-26 DIAGNOSIS — E78.5 DYSLIPIDEMIA, GOAL LDL BELOW 70: ICD-10-CM

## 2020-04-26 DIAGNOSIS — I25.709 CORONARY ARTERY DISEASE INVOLVING CORONARY BYPASS GRAFT OF NATIVE HEART WITH ANGINA PECTORIS (H): ICD-10-CM

## 2020-07-06 ENCOUNTER — COMMUNICATION - HEALTHEAST (OUTPATIENT)
Dept: CARDIOLOGY | Facility: CLINIC | Age: 60
End: 2020-07-06

## 2020-07-06 DIAGNOSIS — I21.19 ACUTE MI, INFERIOR WALL, INITIAL EPISODE OF CARE (H): ICD-10-CM

## 2020-11-30 ENCOUNTER — RECORDS - HEALTHEAST (OUTPATIENT)
Dept: LAB | Facility: CLINIC | Age: 60
End: 2020-11-30

## 2020-11-30 LAB
ANION GAP SERPL CALCULATED.3IONS-SCNC: 10 MMOL/L (ref 5–18)
BUN SERPL-MCNC: 18 MG/DL (ref 8–22)
CALCIUM SERPL-MCNC: 9.6 MG/DL (ref 8.5–10.5)
CHLORIDE BLD-SCNC: 100 MMOL/L (ref 98–107)
CHOLEST SERPL-MCNC: 173 MG/DL
CO2 SERPL-SCNC: 28 MMOL/L (ref 22–31)
CREAT SERPL-MCNC: 0.72 MG/DL (ref 0.7–1.3)
FASTING STATUS PATIENT QL REPORTED: ABNORMAL
GFR SERPL CREATININE-BSD FRML MDRD: >60 ML/MIN/1.73M2
GLUCOSE BLD-MCNC: 127 MG/DL (ref 70–125)
HDLC SERPL-MCNC: 52 MG/DL
LDLC SERPL CALC-MCNC: 90 MG/DL
POTASSIUM BLD-SCNC: 4.2 MMOL/L (ref 3.5–5)
SODIUM SERPL-SCNC: 138 MMOL/L (ref 136–145)
TRIGL SERPL-MCNC: 157 MG/DL

## 2021-01-05 ENCOUNTER — COMMUNICATION - HEALTHEAST (OUTPATIENT)
Dept: CARDIOLOGY | Facility: CLINIC | Age: 61
End: 2021-01-05

## 2021-01-05 DIAGNOSIS — I21.19 ACUTE MI, INFERIOR WALL, INITIAL EPISODE OF CARE (H): ICD-10-CM

## 2021-01-26 ENCOUNTER — COMMUNICATION - HEALTHEAST (OUTPATIENT)
Dept: CARDIOLOGY | Facility: CLINIC | Age: 61
End: 2021-01-26

## 2021-01-26 DIAGNOSIS — E78.5 DYSLIPIDEMIA, GOAL LDL BELOW 70: ICD-10-CM

## 2021-01-26 DIAGNOSIS — I25.709 CORONARY ARTERY DISEASE INVOLVING CORONARY BYPASS GRAFT OF NATIVE HEART WITH ANGINA PECTORIS (H): ICD-10-CM

## 2021-01-26 RX ORDER — EZETIMIBE 10 MG/1
TABLET ORAL
Qty: 90 TABLET | Refills: 2 | Status: SHIPPED | OUTPATIENT
Start: 2021-01-26 | End: 2022-01-11

## 2021-02-05 ENCOUNTER — COMMUNICATION - HEALTHEAST (OUTPATIENT)
Dept: CARDIOLOGY | Facility: CLINIC | Age: 61
End: 2021-02-05

## 2021-02-05 DIAGNOSIS — E78.5 DYSLIPIDEMIA, GOAL LDL BELOW 70: ICD-10-CM

## 2021-02-05 DIAGNOSIS — I25.709 CORONARY ARTERY DISEASE INVOLVING CORONARY BYPASS GRAFT OF NATIVE HEART WITH ANGINA PECTORIS (H): ICD-10-CM

## 2021-02-05 RX ORDER — PRAVASTATIN SODIUM 80 MG/1
TABLET ORAL
Qty: 90 TABLET | Refills: 0 | Status: SHIPPED | OUTPATIENT
Start: 2021-02-05 | End: 2022-06-14

## 2021-03-02 ENCOUNTER — RECORDS - HEALTHEAST (OUTPATIENT)
Dept: ADMINISTRATIVE | Facility: OTHER | Age: 61
End: 2021-03-02

## 2021-03-02 ENCOUNTER — AMBULATORY - HEALTHEAST (OUTPATIENT)
Dept: CARDIOLOGY | Facility: CLINIC | Age: 61
End: 2021-03-02

## 2021-03-26 ENCOUNTER — OFFICE VISIT - HEALTHEAST (OUTPATIENT)
Dept: CARDIOLOGY | Facility: CLINIC | Age: 61
End: 2021-03-26

## 2021-03-26 DIAGNOSIS — Z95.1 S/P CABG X 4: ICD-10-CM

## 2021-03-26 DIAGNOSIS — I25.709 CORONARY ARTERY DISEASE INVOLVING CORONARY BYPASS GRAFT OF NATIVE HEART WITH ANGINA PECTORIS (H): ICD-10-CM

## 2021-03-26 DIAGNOSIS — I25.10 CAD (CORONARY ARTERY DISEASE): ICD-10-CM

## 2021-03-26 DIAGNOSIS — E78.5 DYSLIPIDEMIA, GOAL LDL BELOW 70: ICD-10-CM

## 2021-03-26 RX ORDER — BLOOD-GLUCOSE METER
EACH MISCELLANEOUS
Status: SHIPPED | COMMUNITY
Start: 2020-05-08 | End: 2022-01-17

## 2021-03-26 RX ORDER — LANCETS
EACH MISCELLANEOUS
Status: SHIPPED | COMMUNITY
Start: 2020-12-29

## 2021-03-26 RX ORDER — METFORMIN HCL 500 MG
1000 TABLET, EXTENDED RELEASE 24 HR ORAL 2 TIMES DAILY
Status: SHIPPED | COMMUNITY
Start: 2021-03-02

## 2021-03-26 ASSESSMENT — MIFFLIN-ST. JEOR: SCORE: 1897.53

## 2021-04-01 ENCOUNTER — COMMUNICATION - HEALTHEAST (OUTPATIENT)
Dept: CARDIOLOGY | Facility: CLINIC | Age: 61
End: 2021-04-01

## 2021-04-01 DIAGNOSIS — I21.19 ACUTE MI, INFERIOR WALL, INITIAL EPISODE OF CARE (H): ICD-10-CM

## 2021-04-01 RX ORDER — LISINOPRIL 5 MG/1
5 TABLET ORAL DAILY
Qty: 90 TABLET | Refills: 1 | Status: SHIPPED | OUTPATIENT
Start: 2021-04-01 | End: 2022-06-14

## 2021-04-08 ENCOUNTER — HOSPITAL ENCOUNTER (OUTPATIENT)
Dept: CARDIOLOGY | Facility: CLINIC | Age: 61
Discharge: HOME | End: 2021-04-08
Attending: INTERNAL MEDICINE

## 2021-04-08 DIAGNOSIS — I25.10 CAD (CORONARY ARTERY DISEASE): ICD-10-CM

## 2021-04-08 LAB
CV STRESS CURRENT BP HE: NORMAL
CV STRESS CURRENT HR HE: 101
CV STRESS CURRENT HR HE: 102
CV STRESS CURRENT HR HE: 102
CV STRESS CURRENT HR HE: 48
CV STRESS CURRENT HR HE: 49
CV STRESS CURRENT HR HE: 50
CV STRESS CURRENT HR HE: 51
CV STRESS CURRENT HR HE: 52
CV STRESS CURRENT HR HE: 54
CV STRESS CURRENT HR HE: 54
CV STRESS CURRENT HR HE: 55
CV STRESS CURRENT HR HE: 55
CV STRESS CURRENT HR HE: 57
CV STRESS CURRENT HR HE: 60
CV STRESS CURRENT HR HE: 61
CV STRESS CURRENT HR HE: 66
CV STRESS CURRENT HR HE: 70
CV STRESS CURRENT HR HE: 75
CV STRESS CURRENT HR HE: 77
CV STRESS CURRENT HR HE: 78
CV STRESS CURRENT HR HE: 79
CV STRESS CURRENT HR HE: 84
CV STRESS CURRENT HR HE: 85
CV STRESS CURRENT HR HE: 87
CV STRESS CURRENT HR HE: 89
CV STRESS DEVIATION TIME HE: NORMAL
CV STRESS ECHO PERCENT HR HE: NORMAL
CV STRESS EXERCISE STAGE HE: NORMAL
CV STRESS FINAL RESTING BP HE: NORMAL
CV STRESS FINAL RESTING HR HE: 50
CV STRESS MAX HR HE: 102
CV STRESS MAX TREADMILL GRADE HE: 14
CV STRESS MAX TREADMILL SPEED HE: 3.4
CV STRESS PEAK DIA BP HE: NORMAL
CV STRESS PEAK SYS BP HE: NORMAL
CV STRESS PHASE HE: NORMAL
CV STRESS PROTOCOL HE: NORMAL
CV STRESS RESTING PT POSITION HE: NORMAL
CV STRESS RESTING PT POSITION HE: NORMAL
CV STRESS ST DEVIATION AMOUNT HE: NORMAL
CV STRESS ST DEVIATION ELEVATION HE: NORMAL
CV STRESS ST EVELATION AMOUNT HE: NORMAL
CV STRESS TEST TYPE HE: NORMAL
CV STRESS TOTAL STAGE TIME MIN 1 HE: NORMAL
ECHO EJECTION FRACTION ESTIMATED: 60 %
RATE PRESSURE PRODUCT: NORMAL
STRESS ECHO BASELINE DIASTOLIC HE: 84
STRESS ECHO BASELINE HR: 62
STRESS ECHO BASELINE SYSTOLIC BP: 131
STRESS ECHO CALCULATED PERCENT HR: 63 %
STRESS ECHO LAST STRESS DIASTOLIC BP: 68
STRESS ECHO LAST STRESS HR: 102
STRESS ECHO LAST STRESS SYSTOLIC BP: 146
STRESS ECHO POST ESTIMATED WORKLOAD: 8.5
STRESS ECHO POST EXERCISE DUR MIN: 7
STRESS ECHO POST EXERCISE DUR SEC: 5
STRESS ECHO TARGET HR: 161

## 2021-04-13 ENCOUNTER — COMMUNICATION - HEALTHEAST (OUTPATIENT)
Dept: CARDIOLOGY | Facility: CLINIC | Age: 61
End: 2021-04-13

## 2021-04-13 DIAGNOSIS — I21.19 ACUTE MI, INFERIOR WALL, INITIAL EPISODE OF CARE (H): ICD-10-CM

## 2021-04-13 RX ORDER — METOPROLOL SUCCINATE 50 MG/1
50 TABLET, EXTENDED RELEASE ORAL DAILY
Qty: 90 TABLET | Refills: 1 | Status: SHIPPED | OUTPATIENT
Start: 2021-04-13 | End: 2022-06-14

## 2021-04-14 ENCOUNTER — AMBULATORY - HEALTHEAST (OUTPATIENT)
Dept: LAB | Facility: HOSPITAL | Age: 61
End: 2021-04-14

## 2021-04-14 DIAGNOSIS — I25.10 CAD (CORONARY ARTERY DISEASE): ICD-10-CM

## 2021-04-14 LAB
CHOLEST SERPL-MCNC: 149 MG/DL
FASTING STATUS PATIENT QL REPORTED: YES
HDLC SERPL-MCNC: 47 MG/DL
LDLC SERPL CALC-MCNC: 69 MG/DL
TRIGL SERPL-MCNC: 163 MG/DL

## 2021-06-01 ENCOUNTER — RECORDS - HEALTHEAST (OUTPATIENT)
Dept: ADMINISTRATIVE | Facility: CLINIC | Age: 61
End: 2021-06-01

## 2021-06-01 VITALS — BODY MASS INDEX: 38.62 KG/M2 | WEIGHT: 254 LBS

## 2021-06-01 VITALS — WEIGHT: 254 LBS | BODY MASS INDEX: 38.62 KG/M2

## 2021-06-01 VITALS — BODY MASS INDEX: 38.32 KG/M2 | WEIGHT: 252 LBS

## 2021-06-01 VITALS — WEIGHT: 263.9 LBS | HEIGHT: 68 IN | BODY MASS INDEX: 39.99 KG/M2

## 2021-06-01 VITALS — WEIGHT: 262 LBS | BODY MASS INDEX: 39.71 KG/M2 | HEIGHT: 68 IN

## 2021-06-01 VITALS — BODY MASS INDEX: 38.77 KG/M2 | WEIGHT: 255 LBS

## 2021-06-01 VITALS — HEIGHT: 68 IN | BODY MASS INDEX: 38.34 KG/M2 | WEIGHT: 253 LBS

## 2021-06-01 VITALS — BODY MASS INDEX: 38.01 KG/M2 | WEIGHT: 250 LBS

## 2021-06-01 VITALS — BODY MASS INDEX: 38.43 KG/M2 | WEIGHT: 253.6 LBS | HEIGHT: 68 IN

## 2021-06-01 VITALS — WEIGHT: 250 LBS | BODY MASS INDEX: 38.01 KG/M2

## 2021-06-01 VITALS — WEIGHT: 252 LBS | BODY MASS INDEX: 38.32 KG/M2

## 2021-06-01 VITALS — WEIGHT: 247 LBS | BODY MASS INDEX: 37.44 KG/M2 | HEIGHT: 68 IN

## 2021-06-01 VITALS — WEIGHT: 256 LBS | BODY MASS INDEX: 38.92 KG/M2

## 2021-06-02 VITALS — BODY MASS INDEX: 38.95 KG/M2 | HEIGHT: 68 IN | WEIGHT: 257 LBS

## 2021-06-04 ENCOUNTER — RECORDS - HEALTHEAST (OUTPATIENT)
Dept: LAB | Facility: CLINIC | Age: 61
End: 2021-06-04

## 2021-06-04 VITALS
HEIGHT: 68 IN | HEART RATE: 63 BPM | RESPIRATION RATE: 16 BRPM | DIASTOLIC BLOOD PRESSURE: 76 MMHG | WEIGHT: 244 LBS | SYSTOLIC BLOOD PRESSURE: 136 MMHG | BODY MASS INDEX: 36.98 KG/M2

## 2021-06-04 LAB
ANION GAP SERPL CALCULATED.3IONS-SCNC: 13 MMOL/L (ref 5–18)
BUN SERPL-MCNC: 19 MG/DL (ref 8–22)
CALCIUM SERPL-MCNC: 9.9 MG/DL (ref 8.5–10.5)
CHLORIDE BLD-SCNC: 105 MMOL/L (ref 98–107)
CO2 SERPL-SCNC: 21 MMOL/L (ref 22–31)
CREAT SERPL-MCNC: 0.71 MG/DL (ref 0.7–1.3)
GFR SERPL CREATININE-BSD FRML MDRD: >60 ML/MIN/1.73M2
GLUCOSE BLD-MCNC: 132 MG/DL (ref 70–125)
POTASSIUM BLD-SCNC: 4.4 MMOL/L (ref 3.5–5)
SODIUM SERPL-SCNC: 139 MMOL/L (ref 136–145)

## 2021-06-05 VITALS
SYSTOLIC BLOOD PRESSURE: 108 MMHG | BODY MASS INDEX: 36.89 KG/M2 | WEIGHT: 243.4 LBS | DIASTOLIC BLOOD PRESSURE: 80 MMHG | HEIGHT: 68 IN | RESPIRATION RATE: 16 BRPM | HEART RATE: 64 BPM

## 2021-06-06 NOTE — PATIENT INSTRUCTIONS - HE
Please call my nurse Luma with any heart related questions.We will be in touch with the blood work and plan a stress echo.er number is [phone]

## 2021-06-09 ENCOUNTER — RECORDS - HEALTHEAST (OUTPATIENT)
Dept: CARDIOLOGY | Facility: CLINIC | Age: 61
End: 2021-06-09

## 2021-06-16 PROBLEM — E66.9 OBESITY (BMI 30-39.9): Status: ACTIVE | Noted: 2018-04-04

## 2021-06-16 PROBLEM — Z95.1 S/P CABG X 4: Status: ACTIVE | Noted: 2018-04-04

## 2021-06-16 PROBLEM — E66.01 MORBID OBESITY (H): Status: ACTIVE | Noted: 2021-03-26

## 2021-06-16 PROBLEM — E78.5 DYSLIPIDEMIA, GOAL LDL BELOW 70: Status: ACTIVE | Noted: 2018-04-04

## 2021-06-16 PROBLEM — R73.9 HYPERGLYCEMIA: Status: ACTIVE | Noted: 2018-04-04

## 2021-06-16 PROBLEM — E11.9 DIABETES MELLITUS, TYPE 2 (H): Status: ACTIVE | Noted: 2021-03-26

## 2021-06-16 PROBLEM — I21.3 ST ELEVATION (STEMI) MYOCARDIAL INFARCTION (H): Status: ACTIVE | Noted: 2018-03-19

## 2021-06-17 NOTE — PROGRESS NOTES
ITP ASSESSMENT   Assessment Day: Initial  Session Number: 1  Precautions: S/P MI  Diagnosis: MI;Stent  Risk Stratification: High  Referring Provider: Jesus Sepulveda MD  EXERCISE  Exercise Assessment: Initial     6 Minute Walk Test   Pre   Pre Exercise HR: 71                  Pre Exercise BP: 119/74    Peak  Peak HR: 89                 Peak BP: 118/89  Peak feet: 1200  Peak O2 SAT: 98  Peak RPE: 11  Peak MPH: 2.27    Symptoms:  Peak Symptoms: Ankle discomfort    5 mins. Post  5 Min Post HR: 74  5 Min Post BP: 123/84                         Exercise Plan  Goals Next 30 days  ADL'S: TM 2-3x/day  Leisure: Resume home repair projects  Work: Return to work light duty    Education Goals: All goals in this section met  Education Goals Met: Has system for taking medication.;Patient can state cardiac s/s and appropriate emergency response.;Medication review.    Exercise Prescription  Exercise Mode: Treadmill;Bike;Nustep;Arm Erg.;Hallway Walking  Frequency: 2x/week  Duration: 40'  Intensity / THR: 20-30 beats above resting heart rate  RPE 11-14  Progression / Met level: 2.5-3  Resistive Training?: Yes    Current Exercise (mins/week): 1    Interventions  Home Exercise:  Mode: TM  Frequency: 2-3x/day  Duration: 20 min    Education Material : Educational videos;Provide written material;Individual education and counseling;Offer educational classes    Education Completed  Exercise Education Completed: Cardiac Anatomy;Signs and Symptoms;Medication review;Home Exercise;RPE;Emergency Plan;Warm up/cool down;FITT Principles;BP/HR Reponse to exercise;Benefits of Exercise;End point of exercise            Exercise Follow-up/Discharge  Follow up/Discharge: Encouraged pt to use TM daily. Reviewed s/p MI precautions NUTRITION  Nutrition Assessment: Initial    Nutrition Risk Factors:  Nutrition Risk Factors: Dyslipidemia;Overweight  Cholesterol: 133  LDL: 71  HDL: 33  Triglycerides: 145    Nutrition Plan  Interventions  Other Nutrition  "Intervention: Diet Class;Therapist/Pt Discussion;Educational Videos;Provide with Written Material    Education Completed  Nutrition Education Completed: Low Saturated fat diet;Risk factor overview;Low sodium diet;Weight management    Goals  Nutrition Goals (Next 30 days): Patient will follow a low sodium diet;Patient will follow a low saturated fat diet;Patient knows appropriate portion size;Patient will lose weight    Goals Met  Nutrition Goals Met: Patient can identify their risk factors for CAD;Completed Nutritional Risk Screen;Provided Rate your Plate Survey;Reviewed Dietitian schedule    Height, Weight, and  BMI  Weight: 254 lb (115.2 kg)  Height: 5' 8\" (1.727 m)  BMI: 38.63    Nutrition Follow-up  Follow-up/Discharge: Issued diet survey       Other Risk Factors  Other Risk Factor Assessment: Initial    HTN Risk Factor: Hypertension    Pre Exercise BP: 119/74  Post Exercise BP: 123/84    Hypertension Plan  Goals  HTN Goals: Follow low sodium diet;Exercises regularly    Goals Met  HTN Goals Met: Take medication as prescribed    HTN Interventions  HTN Interventions: Diet consult;Therapist/patient discussion;Provide written material;Offer educational videos;Offer educational classes    HTN Education Completed  HTN Education Completed: Low sodium diet;Medication review;Risk factor overview    Tobacco Risk Factor: NA    Risk Factor Follow-up   Follow-up/Discharge: Encouraged education classes   PSYCHOSOCIAL  Psychosocial Assessment: Initial     [facility] Q of L Summary Score: 19    BRAYAN-D Score: 0    Psychosocial Risk Factor: Stress    Psychosocial Plan  Interventions  If BRAYAN-D > 15 send letter to MD  Interventions: Offer Spiritual Care consult;Offer educational videos and classes;Provide written material;Individual education and counseling    Education Completed  Education Completed: Relaxation/Coping Techniques;Effects of stress on body    Goals  Goals (Next 30 days): Practicing stress management " skills    Goals Met  Goals Met: Identified Support system;Oriented to stress management classes;Identify stressors    Psychosocial Follow-up  Follow-up/Discharge: Reviewed effects of stress and importance of stress mgmt     Patient involved in Goal setting?: Yes    Signature: _____________________________________________________________    Date: __________________    Time: __________________

## 2021-06-17 NOTE — PROGRESS NOTES
ITP ASSESSMENT   Assessment Day: 30 Day  Session Number: 11  Precautions: S/P MI  Diagnosis: MI;Stent  Risk Stratification: High  Referring Provider: Dr. Sepulveda  EXERCISE  Exercise Assessment: Reassessment       Tolerates 40'exercise at 3.2-3.8 Mets                         Exercise Plan  Goals Next 30 days  ADL'S: [place], without fatigue  Leisure: Continue to tolerate moderate home repair tasks  Work: Continue to tolerate moderate duty work    Education Goals: All goals in this section met  Education Goals Met: Has system for taking medication.;Patient can state cardiac s/s and appropriate emergency response.;Medication review.                        Goals Met  Initial ADL's goals met: Uses Treadmill 1 x day  Initial Leisure goals met: Has resumed home repair projects  Intial Work goals met: Has resumed light duty work  Initial Progression: Making progress;  Still has not resumed all tasks at home.    Exercise Prescription  Exercise Mode: Treadmill;Bike;Nustep;Arm Erg.  Frequency: 2 x week  Duration: 40'  Intensity / THR: 20-30 beats above resting heart rate  RPE 11-14  Progression / Met level: 3.2-4  Resistive Training?: Yes    Current Exercise (mins/week): 230    Interventions  Home Exercise:  Mode: Treadmill  Frequency: 4-5 days per week  Duration: 30-40'    Education Material : Educational videos;Provide written material;Individual education and counseling;Offer educational classes    Education Completed  Exercise Education Completed: Cardiac Anatomy;Signs and Symptoms;Medication review;RPE;Emergency Plan;Home Exercise;Warm up/cool down;BP/HR Reponse to exercise;FITT Principles;Benefits of Exercise;End point of exercise            Exercise Follow-up/Discharge  Follow up/Discharge: Encouraged pt to increase home ex. to 30-40'         NUTRITION  Nutrition Assessment: Reassessment    Nutrition Risk Factors:  Nutrition Risk Factors: Dyslipidemia;Overweight  Cholesterol: 133  LDL: 71  HDL:  "33  Triglycerides: 145    Nutrition Plan  Interventions  Diet Consult: Declined  Other Nutrition Intervention: Diet Class;Therapist/Pt Discussion;Educational Videos;Provide with Written Material      Education Completed  Nutrition Education Completed: Low Saturated fat diet;Risk factor overview;Low sodium diet;Weight management    Goals  Nutrition Goals (Next 30 days): Patient will follow a low sodium diet;Patient will follow a low saturated fat diet;Patient knows appropriate portion size;Patient will lose weight    Goals Met  Nutrition Goals Met: Patient can identify their risk factors for CAD;Completed Nutritional Risk Screen;Provided Rate your Plate Survey;Reviewed Dietitian schedule    Height, Weight, and  BMI  Weight: 252 lb (114.3 kg)  Height: 5' 8\" (1.727 m)  BMI: 38.33    Nutrition Follow-up  Follow-up/Discharge: Encouraged pt to attend Education classes     Other Risk Factors  Other Risk Factor Assessment: Reassessment    HTN Risk Factor: Hypertension    Pre Exercise BP: 110/70  Post Exercise BP: 126/62    Hypertension Plan  Goals  HTN Goals: Follow low sodium diet;Exercises regularly    Goals Met  HTN Goals Met: Take medication as prescribed    HTN Interventions  HTN Interventions: Diet consult;Therapist/patient discussion;Provide written material;Offer educational videos;Offer educational classes    HTN Education Completed  HTN Education Completed: Low sodium diet;Medication review;Risk factor overview    Tobacco Risk Factor: NA    No Data Recorded      Risk Factor Follow-up   Follow-up/Discharge: Continue  to provide risk factor education as needed       PSYCHOSOCIAL  Psychosocial Assessment: Reassessment     [place] [employer] Q of L Summary Score: 19    BRAYAN-D Score: 0    Psychosocial Risk Factor: Stress    Psychosocial Plan  Interventions  Interventions: Offer Spiritual Care consult;Offer educational videos and classes;Provide written material;Individual education and counseling    Education " Completed  Education Completed: Relaxation/Coping Techniques;Effects of stress on body    Goals  Goals (Next 30 days): Practicing stress management skills    Goals Met  Goals Met: Identified Support system;Oriented to stress management classes;Identify stressors    Psychosocial Follow-up  Follow-up/Discharge: Reports he has a good support system         Patient involved in Goal setting?: Yes    Signature: _____________________________________________________________    Date: __________________    Time: __________________S

## 2021-06-17 NOTE — PROGRESS NOTES
Cardiac Rehab  Phase II Assessment    Assessment Date: 4/5/18    Diagnosis: STEMI  Date of Onset: 3/19/18  Procedure: PCI with ZHEN x 1 to RCA  Date of Onset: 3/19/18  ICD/Pacemaker: No Parameters: NA  Post-op Complications: NA  ECG History: SB/SR, hx of at fib EF%:58  Past Medical History:   Past Medical History:   Diagnosis Date     Myocardial infarction 2009     Patient Active Problem List   Diagnosis     ST elevation (STEMI) myocardial infarction     Coronary artery disease involving coronary bypass graft of native heart with angina pectoris     S/P CABG x 4 (2009)     Dyslipidemia, goal LDL below 70     Obesity (BMI 30-39.9)     Hyperglycemia         Physical Assessment  Precautions/ Physical Limitations: S/P MI, fused R ankle, 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 In the past 12 months, have you been in a relationship where you have been abused physically, emotionally, sexually or financially? No  notified: NA  2. Who do you turn to for emotional support?: Family, friends  3. Do you have cultural or spiritual needs? No  4. Have there been any major life changes in the past 12 months? No    Referral Information  Primary Physician: Shane Mansfield MD  Cardiologist: Vidal  Surgeon:     Home exercise/Equipment: TM    Patient's long-term goal(s): Lose weight    1. Living Accommodations: Home Steps: Yes      Support people at home: Wife and children   2. Marital Status:   3. Family is able to assist with cares      Adventism/Community involvement: Yes  4. Recreation/Hobbies: fishing, spending time with family, vehicle maintenance, TV/movies

## 2021-06-17 NOTE — PROGRESS NOTES
Assessment/Plan:     1.  Coronary artery disease with s/p CABGX 4 in 2009: Patient was recently admitted from 3/19/2018 through 3/22/2018 with STEMI involving inferior wall with elevated troponin.  He underwent coronary angiogram on 3/19/2018 and received EES X 1  to distal RCA which was 100% stenosed his grafts from. LIMA to LAD, SVG to OM and diagonal were patent.   Dual antiplatelet therapy is being used with aspirin indefinitely and ticagrelor for 1 year.   We discussed the importance of antiplatelet therapy and talking with his cardiologist prior to stopping these medications for any reason.      His main symptoms prior to recent MI where chest tightness, fatigue, and left arm tingling.  Patient denies any angina although he still has some fatigue.  He had questions about when he can return to work safely.  His job requires him to do a lot of physical exertion and long hours working.  Will discuss with Dr. Joseph.  Patient was advised to not to return to work until further evaluation by Dr. Joseph for any specific restriction in relates to his recent MI.     He was started on lisinopril 5 mg in the hospital.  His most recent BMP done at PCP is unremarkable.  His metoprolol dose was increased from 25 mg to 50 mg (previous dose) patient was encouraged to call if experiencing lightheaded or dizziness.  With low heart rate.    Risk factor modification and lifestyle management topics were discussed including managing comorbidities, weight loss, heart healthy diet, exercise and stress reduction.  He is starting his cardiac rehab on 4/5/2018.    2.  Dyslipidemia: Hari Montgomery is on high intensity statin therapy with atorvastatin 80 mg daily. His most recent LDL is 74.  His more recent AST/ALT was 46/50 on 3/23/2017.  Will add repeat AST and ALT during his next visit with Dr. Joseph on 4/20/2018-order placed. We discussed a diet low in saturated fat, weight loss, and exercise along with medication for better control of  cholesterol.     4.  Hyperglycemia: Most recent A1c is 6.4.  We discussed the importance of tightly controlled blood sugars to minimize risk of worsening coronary artery disease. Defer to PCP for diabetes management.      Follow-up with Dr. Joseph on 4/20/2018 as scheduled.    Subjective:     Hari Montgomery is a 57 y.o. years old with the significant past medical history of coronary artery disease with status post CABG ×4 in 2009, RCA endarterectomy, obesity with a BMI of 39.84, hyperglycemia who is seen at [facility] for post coronary intervention follow up. Patient was recently admitted from 3/19/2018 through 3/22/2018 with STEMI involving inferior wall with elevated troponin.  He underwent coronary angiogram on 3/19/2018 and received EES X 1  to distal RCA which was 100% stenosed his grafts from. LIMA to LAD, SVG to OM and diagonal were patent. Dual antiplatelet therapy is being used with aspirin indefinitely and ticagrelor for 1 year.  His most recent echocardiogram done on 3/20/2018 showed an EF of 58% with mild basal to mid inferior hypokinesis with mild concentric hypertrophy.  He was followed by Dr. Joseph in the hospital.  He was noted to have a bradycardia heart rate in low 50s and therefore his metoprolol succinate.  Dose was reduced    Today, patient reports feeling improvement in his fatigue (about 60-70%) and denies further chest tightness or tingling in his left arm that he experienced prior to recent MI.  He is BP today is 110/76 and heart rate 68.  He had a BMP check with his recent PCP office visit and appears unremarkable.  He denies lightheadedness, shortness of breath, dyspnea on exertion, orthopnea, PND, palpitations, chest pain and abdominal fullness/bloating.  He did resume some exercise or work on first week post discharge and felt fine but unfortunately in second week he was not able to do anything due to his chronic neck pain issues.  He is following with his primary doctor closely.   Patient was instructed to hold for couple weeks before returning to work by Dr. Joseph.  Patient had questions about how safely he can return to work as his job requires him to do a lot of physical exertion and long hours working.    Patient reports that he is aware of his weight gain, eating  heart healthy diet and not doing physical exercise.  He is motivated to return to his regular habit of regular exercise, eating heart healthy diet and focus on weight loss.    Review of Systems:   General: WNL  Eyes: WNL  Ears/Nose/Throat: WNL  Lungs: WNL  Heart: Shortness of Breath with activity  Stomach: WNL  Bladder: WNL  Muscle/Joints: WNL  Skin: WNL  Nervous System: WNL  Mental Health: WNL     Blood: WNL     Patient Active Problem List   Diagnosis     ST elevation (STEMI) myocardial infarction     Coronary artery disease involving coronary bypass graft of native heart with angina pectoris     S/P CABG x 4 (2009)     Dyslipidemia, goal LDL below 70     Obesity (BMI 30-39.9)       Past Medical History:   Diagnosis Date     Myocardial infarction 2009       Past Surgical History:   Procedure Laterality Date     CV CORONARY ANGIOGRAM N/A 3/19/2018    Procedure: Coronary Angiogram;  Surgeon: Jesus Sepulveda MD;  Location: [facility];  Service:        No family history on file.    Social History     Social History     Marital status:      Spouse name: N/A     Number of children: N/A     Years of education: N/A     Occupational History     Not on file.     Social History Main Topics     Smoking status: Never Smoker     Smokeless tobacco: Never Used     Alcohol use No     Drug use: No     Sexual activity: Not on file     Other Topics Concern     Not on file     Social History Narrative       Current Outpatient Prescriptions   Medication Sig Dispense Refill     acetaminophen (TYLENOL) 325 MG tablet Take 650 mg by mouth at bedtime.       aspirin 81 mg chewable tablet Chew 1 tablet (81 mg total) daily.  0      atorvastatin (LIPITOR) 80 MG tablet Take 1 tablet (80 mg total) by mouth daily. 90 tablet 3     colestipol (COLESTID) 1 gram tablet Take 2 g by mouth daily with supper.       coQ10, ubiquinol, 200 mg cap Take 600 mg by mouth daily.       cyclobenzaprine (FLEXERIL) 10 MG tablet Take 10 mg by mouth 3 (three) times a day as needed.  0     lisinopril (PRINIVIL,ZESTRIL) 5 MG tablet Take 1 tablet (5 mg total) by mouth daily. 30 tablet 0     metoprolol succinate (TOPROL-XL) 50 MG 24 hr tablet Take 1 tablet (50 mg total) by mouth daily. 30 tablet 0     nitroglycerin (NITROSTAT) 0.3 MG SL tablet Place 1 tablet (0.3 mg total) under the tongue every 5 (five) minutes as needed for chest pain. 1 Bottle 0     omeprazole (PRILOSEC) 20 MG capsule Take 1 capsule (20 mg total) by mouth daily before breakfast for 14 days. 14 capsule 0     ticagrelor (BRILINTA) 90 mg Tab Take 1 tablet (90 mg total) by mouth 2 (two) times a day. 60 tablet 11     No current facility-administered medications for this visit.        No Known Allergies    Objective:     Vitals:    04/04/18 0821   BP: 110/76   Pulse: 68   Resp: 16     Body mass index is 39.84 kg/(m^2).    General Appearance:   Alert, cooperative and in no acute distress.   HEENT:  No scleral icterus; the mucous membranes were pink and moist.   Chest: The spine was straight. The chest was symmetric.   Lungs:   Respirations unlabored; the lungs are clear to auscultation.   Cardiovascular:   Regular rhythm. S1 and S2 without murmur, clicks or rubs. Carotid pulses are intact and symmetrical.  No carotid bruits noted.   Abdomen:  Soft, nontender, nondistended, bowel sounds present   Extremities: No cyanosis, clubbing, or except trace edema in right lower extremity (chronic from vein grafts).   Skin: No xanthelasma.   Neurologic: Mood and affect are appropriate.   Puncture site: Right femoral site is soft.  Radial pulses and Pedal pulses intact and symmetrical.  CMS intact.         Lab Review   Lab  Results   Component Value Date    CREATININE 0.77 03/26/2018    BUN 13 03/26/2018     03/26/2018    K 4.6 03/26/2018     03/26/2018    CO2 20 (L) 03/26/2018     Creatinine (mg/dL)   Date Value   03/26/2018 0.77   03/20/2018 0.60 (L)   03/19/2018 0.73   05/05/2017 0.74       Cardiographics  Coronary Angiogram:3/19/18  Conclusion           Estimated blood loss was <20 ml.    The LM vessel was injected.    The LAD vessel was injected .    The ramus vessel was injected.    The left circumflex was injected.    The RCA was injected.    Dist RCA lesion 100% stenosed.      Hari Montgomery is a 57 y.o. old male with CAD s/p ACS and CABG x4 w/ L-LAD, V-D, V-OM, ?WILL-RCA and RCA endarterectomy, obesity, family h/o CAD who is here for inferior STEMI.      Findings:  LM:no obstruction  LAD:occluded in mid-vessel, fills distally via patent L-LAD, w/ mild diffuse disease in distal vessel.   RC: severe diffuse disease in proximal portion, small caliber, similar to prior angio  Lcx:occluded in mid-vessel. Fills distally via patent V-OM  RCA:dominant, occluded in distal vessel     Grafts:  L-LAD: patent  SVG-OM: patent  SVG-D: patent, ectatic distally  WILL-RCA: not visualized on selective, non-selective in-situ, and aortogram injections     Access:  R Femoral artery     PCI:  RCA was engaged w/ a 6F JR4 Guide catheter and the lesion in dRCA was difficult to wire w/ a Forte, so it was instead wired w/ a Turnpike-supported Whisper J wire confirming luminal location via careful injection through the microcatheter. W/ the use of a trapper balloon the wire was exchanged for a Forte and dRCA was ballooned w/ a 2.0x12mm Emerge balloon and stented w/ a [id number] Synergy EES at 12 tk. Final angiography showed no dissection or perforation and a CHIKIS 3 flow.     Closure:   Perclose     Impression/plan:  Pt. is a 57 y.o. old male with CAD s/p ACS and CABG x4 w/ L-LAD, V-D, V-OM, ?WILL-RCA and RCA endarterectomy, obesity, family h/o  CAD who is here for inferior STEMI.  - multi-vessel CAD, patent L-LAD, V-D, V-OM; dRCA s/p endarterectomy and WILL-RCA. Unable to visualize R-RCA, but native RCA is more likely to be the true JEROME. Suspect that WILL may not have matured due to successful endarterectomy  - in either case, now s/p successful PCI to native RCA w/ EESx1 w/ resolution of ST elevations and CP  - ASA 81mg daily indefinitely, ticagrelor 180mg once, followed by 90mg twice daily for at least 12 months  - TTE in am, atorva 80. Hold off on BB/ACEI/nitrates tonight but plan to at least start BB this admission  - continue aggressive risk factor modification          Echocardiogram:3/20/18  Summary     No previous study for comparison. Technically difficult study.    Left ventricle ejection fraction is normal. The calculated left ventricular ejection fraction is 58%.    Normal left ventricular size and systolic function.    Mild basal to mid inferior wall hypokinesis. Mild concentric hypertrophy noted.       45 minutes were spent with the patient with greater than 50% spent on education and counseling.      Kathie Dillon, [facility]

## 2021-06-17 NOTE — PROGRESS NOTES
[facility] Progress Note    Assessment:  1.  Coronary artery disease with acute ST segment elevation March 2018 and prior history of bypass surgery.  Coronary angiographic report as outlined below.  He will continue with lifting restrictions.  He will continue with the current combination medications except we are going to hold the atorvastatin. He will hold the atorvastatin for the next 3-5 days.  If his muscular skeletal symptoms are improved he is going to return back to the pravastatin 80 mg daily which he previously tolerated.  His LDL on pravastatin was 71.    2.  Musculoskeletal discomfort.  I encouraged him to be seen by his primary care physician or urgent care physician today.  He was going to make this appointment.  He was requesting additional possible new prescription for cyclobenzaprine and prednisone but I informed him that I thought it was best if he was evaluated by his primary care physician.        Plan: Follow-up 1 month the plans as outlined.    1. CAD (coronary artery disease)  ECG Clinic - Today   2. S/P CABG x 4 (2009)     3. Dyslipidemia, goal LDL below 70           An After Visit Summary was printed and given to the patient.    Subjective:    Hari Montgomery is a 57 y.o. male who returned for a planned  follow up visit.  We met in hospital March 2018 after he presented with an acute ST segment elevation MI and was evaluated and treated by Dr. Vaughn.  He was taken to the cardiac catheterization laboratory.  Coronary angiographic findings are outlined below.  Occluded left anterior descending in its mid vessel.  Occluded distal right coronary artery., occluded circumflex filling via patent obtuse marginal.  Patent graft to the left anterior descending, patent vein graft to the obtuse marginal.  Patent vein graft to a diagonal that was ectatic.  Right internal mammary artery to the right coronary artery was not well-visualized.  Successful percutaneous intervention to the  native right coronary artery as outlined.  He reports he has had no specific complaints of chest pain.  He has been participating in cardiac rehabilitation.  Unfortunately has significant musculoskeletal discomfort.  He has chronic back and neck issues but indicates that musculoskeletal symptoms have been exacerbated by the atorvastatin.  He has been on pravastatin prior to the admission because of a history of intolerance to statins per review of the chart records and discussion with him.  He does appear to be uncomfortable with certain movements of his shoulders and back.  He recently received cyclobenzaprine and prednisone taper by his primary care physician.    Reports no anginal chest discomfort.  No symptoms similar to symptoms that prompted admission.  He is tolerating the current combination of medications and is aware of the importance of taking the ticagrelor and aspirin.    Review of Systems:   General: WNL  Eyes: WNL  Ears/Nose/Throat: WNL  Lungs: WNL  Heart: WNL  Stomach: WNL  Bladder: WNL  Muscle/Joints: Muscle Weakness, Muscle Pain, Joint Pain  Skin: WNL  Nervous System: WNL  Mental Health: WNL     Blood: WNL      Problem List:    Patient Active Problem List   Diagnosis     ST elevation (STEMI) myocardial infarction     Coronary artery disease involving coronary bypass graft of native heart with angina pectoris     S/P CABG x 4 (2009)     Dyslipidemia, goal LDL below 70     Obesity (BMI 30-39.9)     Hyperglycemia       Social History     Social History     Marital status:      Spouse name: N/A     Number of children: N/A     Years of education: N/A     Occupational History     Not on file.     Social History Main Topics     Smoking status: Never Smoker     Smokeless tobacco: Never Used     Alcohol use No     Drug use: No     Sexual activity: Not on file     Other Topics Concern     Not on file     Social History Narrative       No family history on file.    Current Outpatient Prescriptions  "  Medication Sig Dispense Refill     acetaminophen (TYLENOL) 325 MG tablet Take 650 mg by mouth at bedtime.       aspirin 81 mg chewable tablet Chew 1 tablet (81 mg total) daily.  0     atorvastatin (LIPITOR) 80 MG tablet Take 1 tablet (80 mg total) by mouth daily. 90 tablet 3     colestipol (COLESTID) 1 gram tablet Take 2 g by mouth daily with supper.       coQ10, ubiquinol, 200 mg cap Take 600 mg by mouth daily.       cyclobenzaprine (FLEXERIL) 10 MG tablet Take 10 mg by mouth 3 (three) times a day as needed.  0     lisinopril (PRINIVIL,ZESTRIL) 5 MG tablet Take 1 tablet (5 mg total) by mouth daily. 30 tablet 0     methylPREDNISolone (MEDROL DOSEPACK) 4 mg tablet Take 4 mg by mouth. Follow package directions       metoprolol succinate (TOPROL-XL) 50 MG 24 hr tablet Take 1 tablet (50 mg total) by mouth daily. 30 tablet 0     nitroglycerin (NITROSTAT) 0.3 MG SL tablet Place 1 tablet (0.3 mg total) under the tongue every 5 (five) minutes as needed for chest pain. 1 Bottle 0     ticagrelor (BRILINTA) 90 mg Tab Take 1 tablet (90 mg total) by mouth 2 (two) times a day. 60 tablet 11     No current facility-administered medications for this visit.        Objective:     /76 (Patient Site: Right Arm, Patient Position: Sitting, Cuff Size: Adult Large)  Pulse 80  Resp 16  Ht 5' 8\" (1.727 m)  Wt (!) 253 lb 9.6 oz (115 kg) Comment: shoes on  BMI 38.56 kg/m2  (!) 253 lb 9.6 oz (115 kg)       Physical Exam:    GENERAL APPEARANCE: alert, mild distress with movements due to musculoskeletal discomfort.  HEENT: no scleral icterus or xanthelasma  NECK: jugular venous pressure does not appear obviously elevated.  CHEST: symmetric, the lungs are clear to auscultation  CARDIOVASCULAR: regular rhythm without murmur, S4; no carotid bruits  Abdomen: No Organomegaly, masses, bruits, or tenderness. Bowels sounds are present      EXTREMITIES: no cyanosis, clubbing or edema, left shoulder is tender to palpation and upward " movement.    Cardiac Testing:  Hari Montgomery is a 57 y.o. old male with CAD s/p ACS and CABG x4 w/ L-LAD, V-D, V-OM, ?WILL-RCA and RCA endarterectomy, obesity, family h/o CAD who is here for inferior STEMI.      Findings:  LM:no obstruction  LAD:occluded in mid-vessel, fills distally via patent L-LAD, w/ mild diffuse disease in distal vessel.   RC: severe diffuse disease in proximal portion, small caliber, similar to prior angio  Lcx:occluded in mid-vessel. Fills distally via patent V-OM  RCA:dominant, occluded in distal vessel     Grafts:  L-LAD: patent  SVG-OM: patent  SVG-D: patent, ectatic distally  WILL-RCA: not visualized on selective, non-selective in-situ, and aortogram injections     Access:  R Femoral artery     PCI:  RCA was engaged w/ a 6F JR4 Guide catheter and the lesion in dRCA was difficult to wire w/ a Forte, so it was instead wired w/ a Turnpike-supported Whisper J wire confirming luminal location via careful injection through the microcatheter. W/ the use of a trapper balloon the wire was exchanged for a Forte and dRCA was ballooned w/ a 2.0x12mm Emerge balloon and stented w/ a [id number] Synergy EES at 12 tk. Final angiography showed no dissection or perforation and a CHIKIS 3 flow.     Closure:   Perclose     Impression/plan:  Pt. is a 57 y.o. old male with CAD s/p ACS and CABG x4 w/ L-LAD, V-D, V-OM, ?WILL-RCA and RCA endarterectomy, obesity, family h/o CAD who is here for inferior STEMI.  - multi-vessel CAD, patent L-LAD, V-D, V-OM; dRCA s/p endarterectomy and WILL-RCA. Unable to visualize R-RCA, but native RCA is more likely to be the true JEROME. Suspect that WILL may not have matured due to successful endarterectomy  - in either case, now s/p successful PCI to native RCA w/ EESx1 w/ resolution of ST elevations and CP  - ASA 81mg daily indefinitely, ticagrelor 180mg once, followed by 90mg twice daily for at least 12 months  - TTE in am, atorva 80. Hold off on BB/ACEI/nitrates tonight but plan to  at least start BB this admission  - continue aggressive risk factor modification    ECG today demonstrates normal sinus rhythm, RSR prime, inferior infarct.  Compared to March 22, 2018 heart rate is faster otherwise EKG appears similar.      Lab Results:    Lab Results   Component Value Date     03/26/2018    K 4.6 03/26/2018     03/26/2018    CO2 20 (L) 03/26/2018    BUN 13 03/26/2018    CREATININE 0.77 03/26/2018    CALCIUM 9.3 03/26/2018     Lab Results   Component Value Date    CHOL 133 03/20/2018    TRIG 145 03/20/2018    HDL 33 (L) 03/20/2018     No results found for: BNP  Creatinine (mg/dL)   Date Value   03/26/2018 0.77   03/20/2018 0.60 (L)   03/19/2018 0.73   05/05/2017 0.74     LDL Calculated (mg/dL)   Date Value   03/20/2018 71   03/23/2017 82   06/13/2016 79     Lab Results   Component Value Date    WBC 6.7 03/19/2018    HGB 13.5 (L) 03/20/2018    HCT 46.1 03/19/2018    MCV 92 03/19/2018     03/21/2018               This note has been dictated using voice recognition software. Any grammatical or context distortions are unintentional and inherent to the software.      Soren Joseph M.D., F.A.C.C.  [facility], [place]  [phone]

## 2021-06-18 NOTE — LETTER
Letter by Luma Solomon RN at      Author: Luma Solomon RN Service: -- Author Type: --    Filed:  Encounter Date: 1/14/2019 Status: (Other)       January 11, 2019     Patient: Hari Montgomery   YOB: 1960   Date of Visit: 1/11/2019       To Whom It May Concern:    It is my medical opinion that Hari Montgomery may return to work with the following restrictions: 70 pound weight restriction and if he has any cardiac symptoms he should rest until symptoms have subsided.  Hari will see cardiologist again in May 2019, and we will be reevaluating his restrictions during that visit .    If you have any questions or concerns, please don't hesitate to call.    Sincerely,        Electronically signed by Luma BECKMAN RN for Dr Soren Joseph

## 2021-06-18 NOTE — PROGRESS NOTES
ITP ASSESSMENT   Assessment Day: 60 Day/ Discharge (Pt's request)  Session Number: 16  Precautions: Standard  Diagnosis: MI;Stent  Risk Stratification: High  Referring Provider: Dr Sepulveda  EXERCISE  Exercise Assessment: Discharge     6 Minute Walk Test   Pre   Pre Exercise HR: 64                  Pre Exercise BP: 104/70    Peak  Peak HR: 90                 Peak BP: 149/92  Peak feet: 1510  Peak O2 SAT: 97  Peak RPE: 12  Peak MPH: 2.86    Symptoms:  Peak Symptoms: none    5 mins. Post  5 Min Post HR: 68  5 Min Post BP: 125/76                         Exercise Plan  Goals Next 30 days  ADL'S: [place] cl[place]up, without fatigue  Leisure: Continue to tolerate moderate home repair tasks  Work: Continue to tolerate moderate duty work    Education Goals: All goals in this section met  Education Goals Met: Has system for taking medication.;Patient can state cardiac s/s and appropriate emergency response.;Medication review.                        Goals Met  30 day ADL'S goals met: Pt reports resuming yardwork and home repair tasks without sx's  30 day Work goals met: Pt reports tolerating work without sx's  30 Day Progression: Pt has met all goals without sx's and requests to D/C from Cardiac Rehab    Initial ADL's goals met: Uses Treadmill 1 x day  Initial Leisure goals met: Has resumed home repair projects  Intial Work goals met: Has resumed light duty work  Initial Progression: Making progress;  Still has not resumed all tasks at home.    Exercise Prescription  Exercise Mode: Treadmill;Bike;Nustep;Arm Erg.  Frequency: 2 x week  Duration: 40'  Intensity / THR: 20-30 beats above resting heart rate  RPE 11-14  Progression / Met level: 3.2-4  Resistive Training?: Yes    Current Exercise (mins/week): 230    Interventions  Home Exercise:  Mode: Treadmill  Frequency: 4-5 days per week  Duration: 30-40'    Education Material : Educational videos;Provide written material;Individual education and counseling;Offer educational  "classes    Education Completed  Exercise Education Completed: Cardiac Anatomy;Signs and Symptoms;Medication review;RPE;Emergency Plan;Home Exercise;Warm up/cool down;BP/HR Reponse to exercise;FITT Principles;Benefits of Exercise;End point of exercise            Exercise Follow-up/Discharge  Follow up/Discharge: Reviewed home exercise plan and emergency plan. All questions answered and pt stated understanding. NUTRITION  Nutrition Assessment: Discharge    Nutrition Risk Factors:  Nutrition Risk Factors: Dyslipidemia;Overweight    Nutrition Plan  Interventions  Diet Consult: Declined  Other Nutrition Intervention: Diet Class;Therapist/Pt Discussion;Educational Videos;Provide with Written Material    Education Completed  Nutrition Education Completed: Low Saturated fat diet;Risk factor overview;Low sodium diet;Weight management    Goals  Nutrition Goals (Next 30 days): Patient will follow a low sodium diet;Patient will follow a low saturated fat diet;Patient knows appropriate portion size;Patient will lose weight    Goals Met  Nutrition Goals Met: Patient can identify their risk factors for CAD;Completed Nutritional Risk Screen;Provided Rate your Plate Survey;Reviewed Dietitian schedule    Height, Weight, and  BMI  Weight: 252 lb (114.3 kg)  Height: 5' 8\" (1.727 m)  BMI: 38.33    Nutrition Follow-up  Follow-up/Discharge: Encouraged pt to attend Education classes       Other Risk Factors  Other Risk Factor Assessment: Discharge    HTN Risk Factor: Hypertension    Pre Exercise BP: 130/70  Post Exercise BP: 124/64    Hypertension Plan  Goals  HTN Goals: Follow low sodium diet;Exercises regularly    Goals Met  HTN Goals Met: Take medication as prescribed    HTN Interventions  HTN Interventions: Diet consult;Therapist/patient discussion;Provide written material;Offer educational videos;Offer educational classes    HTN Education Completed  HTN Education Completed: Low sodium diet;Medication review;Risk factor " overview    Tobacco Risk Factor: NA    Risk Factor Follow-up   Follow-up/Discharge: Continue  to provide risk factor education as needed   PSYCHOSOCIAL  Psychosocial Assessment: Discharge     Rey LLOYDPAULO Q of L Summary Score: 14    BRAYAN-D Score: 1    Psychosocial Risk Factor: Stress    Psychosocial Plan  Interventions  Interventions: Offer Spiritual Care consult;Offer educational videos and classes;Provide written material;Individual education and counseling    Education Completed  Education Completed: Relaxation/Coping Techniques;Effects of stress on body    Goals  Goals (Next 30 days): Practicing stress management skills    Goals Met  Goals Met: Identified Support system;Oriented to stress management classes;Identify stressors    Psychosocial Follow-up  Follow-up/Discharge: Reports he has a good support system     Patient involved in Goal setting?: Yes    Signature: _____________________________________________________________    Date: __________________    Time: __________________See Doc Flowsheet

## 2021-06-19 NOTE — LETTER
Letter by Meri Kam RN at      Author: Meri Kam RN Service: -- Author Type: --    Filed:  Encounter Date: 5/21/2019 Status: (Other)         Hari Montgomery  [address] [facility] [address]             May 21, 2019         Dear Mr. Montgomery,    Below are the results from your recent visit:    Resulted Orders   ALT   Result Value Ref Range    ALT 37 0 - 45 U/L   AST   Result Value Ref Range    AST 29 0 - 40 U/L     The test results show that your liver function tests were normal     Notes recorded by Soren Joseph MD on 5/19/2019 at 7:03 PM CDT   Liver is still normal mdg    Sincerely,    Meri FOWLER

## 2021-06-20 NOTE — PROGRESS NOTES
[facility] Progress Note    Assessment:  1.  Coronary artery disease with acute ST segment elevation MI March 2018 with prior bypass surgery as previously described.  He is doing well clinically without complaints of anginal chest discomfort and back to his usual routine although keeping his weight restriction to 25 pounds.  He is going to continue with the current combination of medications.  Given his job requirements we are going to plan an exercise stress echocardiogram as follow-up.  We discussed the importance of remaining on Brilinta and aspirin and not stopping this combination medications without being in touch with us.    2.  Dyslipidemia.  He has intolerant to Crestor and atorvastatin.  He is on pravastatin  As well as colestipol.  I have asked him to have a follow-up lipid and liver panel which she wishes to do through his primary care physician's office and will schedule this and update me with the results.    Plan: As outlined above with follow-up in 4-5 months and plan stress echocardiogram.    1. CAD (coronary artery disease)  Echo Stress Exercise   2. Coronary artery disease involving coronary bypass graft of native heart with angina pectoris (H)     3. S/P CABG x 4 (2009)     4. Dyslipidemia, goal LDL below 70           An After Visit Summary was printed and given to the patient.    Subjective:    Hari Montgomery is a 57 y.o. male who returned for a planned  follow up visit.  He reports feeling well.  He is back to work.  His job does require some strenuous to.  He has been limiting himself to 25 pounds.  He has had no complaints of chest pain, dizziness, lightheadedness, syncope or near syncope.  Blood pressures at home have been running 110-120/70 per his report.    He presented with an ST segment elevation MI in the inferior leads March 2018.  Coronary angiogram as described below with occlusion of the left anterior descending in its mid vessel.  Occlusion of the distal right  coronary artery, occlusion of the circumflex filling via patent obtuse marginal branches, patent graft to the LAD, patent graft to the obtuse marginal, patent vein graft to a diagonal that was ectatic.  Right internal mammary artery to the right coronary artery was not well-visualized with successful percutaneous intervention to the native right coronary artery.    Echocardiogram March 2018 revealed normal systolic function with mild basilar inferior wall motion abnormality and no significant valve abnormality.    Review of Systems:   General: WNL  Eyes: WNL  Ears/Nose/Throat: WNL  Lungs: WNL  Heart: WNL  Stomach: WNL  Bladder: WNL  Muscle/Joints: WNL  Skin: WNL  Nervous System: WNL  Mental Health: WNL     Blood: WNL      Problem List:    Patient Active Problem List   Diagnosis     ST elevation (STEMI) myocardial infarction (H)     Coronary artery disease involving coronary bypass graft of native heart with angina pectoris (H)     S/P CABG x 4 (2009)     Dyslipidemia, goal LDL below 70     Obesity (BMI 30-39.9)     Hyperglycemia       Social History     Social History     Marital status:      Spouse name: N/A     Number of children: N/A     Years of education: N/A     Occupational History     Not on file.     Social History Main Topics     Smoking status: Never Smoker     Smokeless tobacco: Never Used     Alcohol use No     Drug use: No     Sexual activity: Not on file     Other Topics Concern     Not on file     Social History Narrative       No family history on file.    Current Outpatient Prescriptions   Medication Sig Dispense Refill     acetaminophen (TYLENOL) 325 MG tablet Take 650 mg by mouth at bedtime.       aspirin 81 mg chewable tablet Chew 1 tablet (81 mg total) daily.  0     BRILINTA 90 mg Tab Take 90 mg by mouth 2 (two) times a day.  11     colestipol (COLESTID) 1 gram tablet Take 2 g by mouth daily with supper.       coQ10, ubiquinol, 200 mg cap Take 600 mg by mouth daily.       cyclobenzaprine  "(FLEXERIL) 10 MG tablet Take 10 mg by mouth 3 (three) times a day as needed.  0     lisinopril (PRINIVIL,ZESTRIL) 5 MG tablet Take 1 tablet (5 mg total) by mouth daily. 90 tablet 5     metoprolol succinate (TOPROL-XL) 50 MG 24 hr tablet Take 50 mg by mouth daily.       pravastatin (PRAVACHOL) 80 MG tablet Take 80 mg by mouth at bedtime.  1     atorvastatin (LIPITOR) 80 MG tablet Take 1 tablet (80 mg total) by mouth daily. 90 tablet 3     lisinopril (PRINIVIL,ZESTRIL) 5 MG tablet Take 1 tablet (5 mg total) by mouth daily. 30 tablet 0     methylPREDNISolone (MEDROL DOSEPACK) 4 mg tablet Take 4 mg by mouth. Follow package directions       metoprolol succinate (TOPROL-XL) 50 MG 24 hr tablet Take 1 tablet (50 mg total) by mouth daily. 30 tablet 0     nitroglycerin (NITROSTAT) 0.3 MG SL tablet Place 1 tablet (0.3 mg total) under the tongue every 5 (five) minutes as needed for chest pain. 1 Bottle 0     ticagrelor (BRILINTA) 90 mg Tab Take 1 tablet (90 mg total) by mouth 2 (two) times a day. 60 tablet 11     No current facility-administered medications for this visit.        Objective:     BP 96/66 (Patient Site: Right Arm, Patient Position: Sitting, Cuff Size: Adult Large)  Pulse 64  Resp 16  Ht 5' 8.25\" (1.734 m)  Wt (!) 253 lb (114.8 kg)  BMI 38.19 kg/m2  (!) 253 lb (114.8 kg)     104/60    Physical Exam:    GENERAL APPEARANCE: alert, no apparent distress  HEENT: no scleral icterus or xanthelasma  NECK: jugular venous pressure within normal limits  CHEST: symmetric, the lungs are clear to auscultation  CARDIOVASCULAR: regular rhythm without murmur, S4 no carotid bruits  Abdomen: No Organomegaly, masses, bruits, or tenderness. Bowels sounds are present      EXTREMITIES: no cyanosis, clubbing or edema.  Palpable pulses in the dorsalis pedis and posterior tibial distribution    Cardiac Testing:  Patient Information      Patient Name MRN Sex              Age     Hari Montgomery [id number] Male 1960 (57 y.o.)     "   Indications      Acute coronary syndrome       Summary        No previous study for comparison. Technically difficult study.    Left ventricle ejection fraction is normal. The calculated left ventricular ejection fraction is 58%.    Normal left ventricular size and systolic function.    Mild basal to mid inferior wall hypokinesis. Mild concentric hypertrophy noted.     Hari Montgomery is a 57 y.o. old male with CAD s/p ACS and CABG x4 w/ L-LAD, V-D, V-OM, ?WILL-RCA and RCA endarterectomy, obesity, family h/o CAD who is here for inferior STEMI.      Findings:  LM:no obstruction  LAD:occluded in mid-vessel, fills distally via patent L-LAD, w/ mild diffuse disease in distal vessel.   RC: severe diffuse disease in proximal portion, small caliber, similar to prior angio  Lcx:occluded in mid-vessel. Fills distally via patent V-OM  RCA:dominant, occluded in distal vessel     Grafts:  L-LAD: patent  SVG-OM: patent  SVG-D: patent, ectatic distally  WILL-RCA: not visualized on selective, non-selective in-situ, and aortogram injections     Access:  R Femoral artery     PCI:  RCA was engaged w/ a 6F JR4 Guide catheter and the lesion in dRCA was difficult to wire w/ a Forte, so it was instead wired w/ a Turnpike-supported Whisper J wire confirming luminal location via careful injection through the microcatheter. W/ the use of a trapper balloon the wire was exchanged for a Forte and dRCA was ballooned w/ a 2.0x12mm Emerge balloon and stented w/ a [id number] Synergy EES at 12 tk. Final angiography showed no dissection or perforation and a CHIKIS 3 flow.     Closure:   Perclose     Impression/plan:  Pt. is a 57 y.o. old male with CAD s/p ACS and CABG x4 w/ L-LAD, V-D, V-OM, ?WILL-RCA and RCA endarterectomy, obesity, family h/o CAD who is here for inferior STEMI.  - multi-vessel CAD, patent L-LAD, V-D, V-OM; dRCA s/p endarterectomy and WILL-RCA. Unable to visualize R-RCA, but native RCA is more likely to be the true JEROME. Suspect that  WILL may not have matured due to successful endarterectomy  - in either case, now s/p successful PCI to native RCA w/ EESx1 w/ resolution of ST elevations and CP  - ASA 81mg daily indefinitely, ticagrelor 180mg once, followed by 90mg twice daily for at least 12 months  - TTE in am, atorva 80. Hold off on BB/ACEI/nitrates tonight but plan to at least start BB this admission  - continue aggressive risk factor modification      Lab Results:    Lab Results   Component Value Date     06/06/2018    K 4.4 06/06/2018     06/06/2018    CO2 23 06/06/2018    BUN 17 06/06/2018    CREATININE 0.66 (L) 06/06/2018    CALCIUM 9.3 06/06/2018     Lab Results   Component Value Date    CHOL 133 03/20/2018    TRIG 145 03/20/2018    HDL 33 (L) 03/20/2018     No results found for: BNP  Creatinine (mg/dL)   Date Value   06/06/2018 0.66 (L)   03/26/2018 0.77   03/20/2018 0.60 (L)   03/19/2018 0.73     LDL Calculated (mg/dL)   Date Value   03/20/2018 71   03/23/2017 82   06/13/2016 79     Lab Results   Component Value Date    WBC 6.7 03/19/2018    HGB 13.5 (L) 03/20/2018    HCT 46.1 03/19/2018    MCV 92 03/19/2018     03/21/2018               This note has been dictated using voice recognition software. Any grammatical or context distortions are unintentional and inherent to the software.      Soren Joseph M.D., F.A.C.C.  [facility]  [phone]

## 2021-06-20 NOTE — LETTER
Letter by Luma Solomon RN at      Author: Luma Solomon RN Service: -- Author Type: --    Filed:  Encounter Date: 3/19/2020 Status: (Other)         Hari Montgomery  [address] [facility] [address]             March 19, 2020         Dear Mr. Montgomery,    Below are the results from your recent visit:    Resulted Orders   Comprehensive metabolic panel   Result Value Ref Range    Sodium 135 (L) 136 - 145 mmol/L    Potassium 4.7 3.5 - 5.0 mmol/L    Chloride 99 98 - 107 mmol/L    CO2 25 22 - 31 mmol/L    Anion Gap, Calculation 11 5 - 18 mmol/L    Glucose 334 (H) 70 - 125 mg/dL    BUN 12 8 - 22 mg/dL    Creatinine 0.80 0.70 - 1.30 mg/dL    GFR MDRD Af Amer >60 >60 mL/min/1.73m2    GFR MDRD Non Af Amer >60 >60 mL/min/1.73m2    Bilirubin, Total 1.0 0.0 - 1.0 mg/dL    Calcium 9.2 8.5 - 10.5 mg/dL    Protein, Total 7.2 6.0 - 8.0 g/dL    Albumin 4.1 3.5 - 5.0 g/dL    Alkaline Phosphatase 80 45 - 120 U/L    AST 31 0 - 40 U/L    ALT 44 0 - 45 U/L    Narrative    Fasting Glucose reference range is 70-99 mg/dL per  American Diabetes Association (ADA) guidelines.   Lipid Profile   Result Value Ref Range    Triglycerides 242 (H) <=149 mg/dL    Cholesterol 160 <=199 mg/dL    LDL Calculated 71 <=129 mg/dL    HDL Cholesterol 41 >=40 mg/dL    Patient Fasting > 8hrs? Yes    Glycosylated Hemoglobin A1C   Result Value Ref Range    Hemoglobin A1c 13.0 (H) <=5.6 %      Comment:      Prediabetes:   HBA1c       5.7 to 6.4%        Diabetes:        HBA1c        >=6.5%   Patients with Hgb F >5%, total bilirubin >10.0 mg/dL, abnormal red cell turnover, severe renal or hepatic disease or malignancy should not have this A1C method used to diagnose or monitor diabetes.         As we discussed, results were sent to Dr Mansfield as well.  Please contact him to discuss diabetes management.    Please call with questions or contact us using [id number].    Sincerely,        Electronically signed by Luma BECKMAN RN for Dr Joseph

## 2021-06-22 NOTE — TELEPHONE ENCOUNTER
Pt has appt at PCP and would like lab orders faxed to them.  FLP already in, BMP and LFT added.  Orders faxed to [facility].  LM for pt to remind him that FLP needs to be fasting 12-14 hours.  -bj

## 2021-06-23 NOTE — TELEPHONE ENCOUNTER
JIN stating recommendations.  Lab orders placed, Zetia rx sent, colestipol discontinued.  -ra    ----- Message -----  From: Soren Joseph MD  Sent: 1/24/2019   5:34 PM  To: Luma Solomon, RN, Ghanshyam Kemp, PharmD    Thank you for your thorough note.  I agree that there is more data with respect to Zetia.  I think that Repatha or Praluent would be an excellent idea however his LDL was 81 so I was hoping to see if we get a little bit better response to Zetia then make the decision in a few months.  I do want to be aggressive with lowering his cholesterol given his history of coronary artery disease.  Luma can we asked him to discontinue the Colestid and start Zetia 10 mg daily.  Can we ask him to continue to be diligent about his diet.  Can we plan a lipid and AST and ALT in approximately 2 months asking him to call us if he is having any specific symptoms.HERIBERTO Joseph

## 2021-06-23 NOTE — TELEPHONE ENCOUNTER
Pt states he is only taking pravastatin 80 mg at bedtime, and colestid 2 g with dinner.  Atorvastatin removed from med list.    Update sent to Dr Jayce kolb

## 2021-06-23 NOTE — TELEPHONE ENCOUNTER
Hi Anand  This is a patient of mine who has premature coronary artery disease.  He did not tolerate atorvastatin so he remains on pravastatin 80 mg daily in addition to Colestid.  I was trying to review information as to whether Zetia would have more benefit in getting his LDL to goal in comparison to Colestid I was also trying to determine the feasibility of utilizing all 3 together.  I was wondering if you are anybody in the Pharm.D. department would be able to further investigate this for me.  Thank you.  As always appreciate your help. Solitario Joseph

## 2021-06-23 NOTE — TELEPHONE ENCOUNTER
Statins have been studied in combination with Zetia and with bile acid sequestrants (such as Colestid) but not in combinations of all three. The LDL reduction between the different products appears to be a synergistic effect. Utilizing all three medication classes is feasible, but bile acid sequestrants reduce the effective concentration of statins and Zetia when given together so it is recommended that they are taken either 2 hours before or 4 hours after bile acid sequestrants.

## 2021-06-23 NOTE — TELEPHONE ENCOUNTER
In this case, you could add zetia to pravastatin and have less issues with drug interactions and absorption.   In combination with statins, Zetia and Colestid appear to have similar effects in lowering LDL. However, the major drawback to bile acid sequestrants, significant adverse GI effects, is not seen with Zetia and there is more data supporting the use of Zetia in conjunction with statins; including being able to avoid high doses of statins by the addition of Zetia to low dose statins while maintaining an LDL reduction similar to that of high dose statins. (Routing comment)      Would you consider a PCSK9 inhibitor if covered?

## 2021-06-23 NOTE — TELEPHONE ENCOUNTER
Pt requesting letter for work.  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    Message sent to Dr Joseph for recommendations.  -bj

## 2021-06-23 NOTE — PATIENT INSTRUCTIONS - HE
Please contact your insurance carrier regarding the potential cost of Zetia 10mg a day and let me know what you find out.My nurse is Luma and her number is [phone]

## 2021-06-23 NOTE — TELEPHONE ENCOUNTER
Can we clarify, I dont thin he is taking the atorvastatin, this was to be removed from his list  mdg

## 2021-06-23 NOTE — TELEPHONE ENCOUNTER
Pt reports Zetia is affordable.    Dr Joseph - per your note 1/11/19 would you like him to start 10 mg daily?  Per his chart he is taking atorvastatin, colestipol, and pravastatin.  -bj

## 2021-06-25 NOTE — TELEPHONE ENCOUNTER
----- Message from Luma Solomon RN sent at 1/25/2019  9:20 AM CST -----  Regarding: flp / ast / alt  D/C colestipol, start zetia 1/25/19.  Recheck 2 months.

## 2021-06-26 NOTE — PROGRESS NOTES
Progress Notes by Soren Joseph MD at 5/30/2018  9:10 AM     Author: Soren Joseph MD Service: -- Author Type: Physician    Filed: 6/6/2018  9:52 PM Encounter Date: 5/30/2018 Status: Addendum    : Soren Joseph MD (Physician)    Related Notes: Original Note by Soren Joseph MD (Physician) filed at 5/31/2018  7:51 AM       [facility] Progress Note    Assessment:  1.  Coronary artery disease with acute ST segment elevation MI March 2018 and prior bypass surgery as outlined.  Coronary intervention to the right coronary artery as described.  He was doing well clinically.  I recommended that he continue with a 25 pound weight restriction.  He is to monitor for specific cardiovascular symptoms or complaints. I have asked asked him to resume the lisinopril 5 mg daily.  We will plan to repeat his chemistries in approximately 1 week.    2.  Dyslipidemia.  Intolerant to atorvastatin and Crestor.  He reported to me that he is taking pravastatin but this was not listed as 1 of his medications.  I will asked that we call to confirm his current statin that he is taking.        Plan: Outlined above with follow-up in 3 months.          An After Visit Summary was printed and given to the patient.    Subjective:    Hari Montgomery is a 57 y.o. male who returned for a planned  follow up visit.  He reports that he is feeling well.  He has had no additional complaints of chest pain.  He has had no complaints of shortness of breath, dizziness or lightheadedness.  He inadvertently stopped his lisinopril after he ran out of the prescription.  Blood pressures have been running within normal limits.  We did talk however about potential benefits of ACE inhibitors post myocardial infarction.    Shoulder discomfort musculoskeletal discomfort has improved with the treatment rendered by Dr. Mansfield.    He presented with ST elevation MI in the inferior leads March 2018.  He had coronary angiographic findings as  described below.  He has occlusion of the left anterior descending in its mid vessel.  Occluded distal right coronary artery, occluded circumflex filling via patent obtuse marginal branches, patent graft to the LAD, patent vein graft to the obtuse marginal, patent vein graft to a diagonal that was ectatic.  Right internal mammary artery to the right coronary artery was not well-visualized.  He had successful percutaneous intervention to the native right coronary artery.    Cardiogram completed March 2018 revealed normal left ventricular function with mild basilar inferior wall hypokinesis and no significant valve abnormality.    Review of Systems:   General: WNL  Eyes: WNL  Ears/Nose/Throat: WNL  Lungs: WNL  Heart: WNL  Stomach: WNL  Bladder: WNL  Muscle/Joints: WNL  Skin: WNL  Nervous System: WNL  Mental Health: WNL     Blood: WNL      Problem List:    Patient Active Problem List   Diagnosis   ? ST elevation (STEMI) myocardial infarction   ? Coronary artery disease involving coronary bypass graft of native heart with angina pectoris   ? S/P CABG x 4 (2009)   ? Dyslipidemia, goal LDL below 70   ? Obesity (BMI 30-39.9)   ? Hyperglycemia       Social History     Social History   ? Marital status:      Spouse name: N/A   ? Number of children: N/A   ? Years of education: N/A     Occupational History   ? Not on file.     Social History Main Topics   ? Smoking status: Never Smoker   ? Smokeless tobacco: Never Used   ? Alcohol use No   ? Drug use: No   ? Sexual activity: Not on file     Other Topics Concern   ? Not on file     Social History Narrative       No family history on file.    Current Outpatient Prescriptions   Medication Sig Dispense Refill   ? acetaminophen (TYLENOL) 325 MG tablet Take 650 mg by mouth at bedtime.     ? aspirin 81 mg chewable tablet Chew 1 tablet (81 mg total) daily.  0   ? BRILINTA 90 mg Tab Take 90 mg by mouth 2 (two) times a day.  11   ? colestipol (COLESTID) 1 gram tablet Take 2 g by  "mouth daily with supper.     ? coQ10, ubiquinol, 200 mg cap Take 600 mg by mouth daily.     ? cyclobenzaprine (FLEXERIL) 10 MG tablet Take 10 mg by mouth 3 (three) times a day as needed.  0   ? lisinopril (PRINIVIL,ZESTRIL) 5 MG tablet Take 1 tablet (5 mg total) by mouth daily. 90 tablet 5   ? methylPREDNISolone (MEDROL DOSEPACK) 4 mg tablet Take 4 mg by mouth. Follow package directions     ? metoprolol succinate (TOPROL-XL) 50 MG 24 hr tablet Take 50 mg by mouth daily.     ? atorvastatin (LIPITOR) 80 MG tablet Take 1 tablet (80 mg total) by mouth daily. 90 tablet 3   ? lisinopril (PRINIVIL,ZESTRIL) 5 MG tablet Take 1 tablet (5 mg total) by mouth daily. 30 tablet 0   ? metoprolol succinate (TOPROL-XL) 50 MG 24 hr tablet Take 1 tablet (50 mg total) by mouth daily. 30 tablet 0   ? nitroglycerin (NITROSTAT) 0.3 MG SL tablet Place 1 tablet (0.3 mg total) under the tongue every 5 (five) minutes as needed for chest pain. 1 Bottle 0   ? ticagrelor (BRILINTA) 90 mg Tab Take 1 tablet (90 mg total) by mouth 2 (two) times a day. 60 tablet 11     No current facility-administered medications for this visit.        Objective:     /76 (Patient Site: Left Arm, Patient Position: Sitting, Cuff Size: Adult Large)  Pulse (!) 52  Resp 16  Ht 5' 8\" (1.727 m)  Wt (!) 247 lb (112 kg)  BMI 37.56 kg/m2  (!) 247 lb (112 kg)   [unfilled]    Physical Exam:    GENERAL APPEARANCE: alert, no apparent distress  HEENT: no scleral icterus or xanthelasma  NECK: jugular venous pressure   CHEST: symmetric, the lungs are clear to auscultation  CARDIOVASCULAR: regular rhythm without murmur, click, or gallop; no carotid bruits  Abdomen: No Organomegaly, masses, bruits, or tenderness. Bowels sounds are present      EXTREMITIES: no cyanosis, clubbing or edema    Cardiac Testing:     Echo Complete   Order# [id number]    Reading physician: Sylvia Carolina MD   Ordering physician: Jesus Sepulveda MD   Study date: 3/20/18         Patient " Information      Patient Name MRN Sex              Age     Hari Montgomery [id number] Male 1960 (57 y.o.)       Indications      Acute coronary syndrome       Summary        No previous study for comparison. Technically difficult study.    Left ventricle ejection fraction is normal. The calculated left ventricular ejection fraction is 58%.    Normal left ventricular size and systolic function.    Mild basal to mid inferior wall hypokinesis. Mild concentric hypertrophy noted.             Procedure Status      Procedure scheduled as Emergent (STEMI).          Conclusion           Estimated blood loss was <20 ml.    The LM vessel was injected.    The LAD vessel was injected .    The ramus vessel was injected.    The left circumflex was injected.    The RCA was injected.    Dist RCA lesion 100% stenosed.      Hari Montgomery is a 57 y.o. old male with CAD s/p ACS and CABG x4 w/ L-LAD, V-D, V-OM, ?WILL-RCA and RCA endarterectomy, obesity, family h/o CAD who is here for inferior STEMI.      Findings:  LM:no obstruction  LAD:occluded in mid-vessel, fills distally via patent L-LAD, w/ mild diffuse disease in distal vessel.   RC: severe diffuse disease in proximal portion, small caliber, similar to prior angio  Lcx:occluded in mid-vessel. Fills distally via patent V-OM  RCA:dominant, occluded in distal vessel     Grafts:  L-LAD: patent  SVG-OM: patent  SVG-D: patent, ectatic distally  WILL-RCA: not visualized on selective, non-selective in-situ, and aortogram injections     Access:  R Femoral artery     PCI:  RCA was engaged w/ a 6F JR4 Guide catheter and the lesion in dRCA was difficult to wire w/ a [place], so it was instead wired w/ a Turnpike-supported Whisper J wire confirming luminal location via careful injection through the microcatheter. W/ the use of a trapper balloon the wire was exchanged for a Forte and dRCA was ballooned w/ a 2.0x12mm Emerge balloon and stented w/ a 2.08e31ic Synergy EES at 12 tk. Final  angiography showed no dissection or perforation and a CHIKIS 3 flow.     Closure:   Perclose     Impression/plan:  Pt. is a 57 y.o. old male with CAD s/p ACS and CABG x4 w/ L-LAD, V-D, V-OM, ?WILL-RCA and RCA endarterectomy, obesity, family h/o CAD who is here for inferior STEMI.  - multi-vessel CAD, patent L-LAD, V-D, V-OM; dRCA s/p endarterectomy and WILL-RCA. Unable to visualize R-RCA, but native RCA is more likely to be the true JEROME. Suspect that WILL may not have matured due to successful endarterectomy  - in either case, now s/p successful PCI to native RCA w/ EESx1 w/ resolution of ST elevations and CP  - ASA 81mg daily indefinitely, ticagrelor 180mg once, followed by 90mg twice daily for at least 12 months  - TTE in am, atorva 80. Hold off on BB/ACEI/nitrates tonight but plan to at least start BB this admission  - continue aggressive risk factor modification              Lab Results:    Lab Results   Component Value Date     03/26/2018    K 4.6 03/26/2018     03/26/2018    CO2 20 (L) 03/26/2018    BUN 13 03/26/2018    CREATININE 0.77 03/26/2018    CALCIUM 9.3 03/26/2018     Lab Results   Component Value Date    CHOL 133 03/20/2018    TRIG 145 03/20/2018    HDL 33 (L) 03/20/2018     No results found for: BNP  Creatinine (mg/dL)   Date Value   03/26/2018 0.77   03/20/2018 0.60 (L)   03/19/2018 0.73   05/05/2017 0.74     LDL Calculated (mg/dL)   Date Value   03/20/2018 71   03/23/2017 82   06/13/2016 79     Lab Results   Component Value Date    WBC 6.7 03/19/2018    HGB 13.5 (L) 03/20/2018    HCT 46.1 03/19/2018    MCV 92 03/19/2018     03/21/2018               This note has been dictated using voice recognition software. Any grammatical or context distortions are unintentional and inherent to the software.      Soren Joseph M.D., F.A.C.C.  [facility]  [phone]

## 2021-06-27 NOTE — PROGRESS NOTES
Progress Notes by Soren Joseph MD at 1/11/2019  2:10 PM     Author: Soren Joseph MD Service: -- Author Type: Physician    Filed: 1/11/2019  3:05 PM Encounter Date: 1/11/2019 Status: Signed    : Soren Joseph MD (Physician)       [facility] Progress Note    Assessment:  1.  Coronary artery disease with prior bypass surgery and acute ST segment myocardial infarction March 2018.  He is doing well clinically without complaints of angina or exercise intolerance.  He is going to continue with the current combination of medications.  We talked about continuing with the dual antiplatelet agents until he returns to see me in 4 months.    2.  Dyslipidemia.  He has been intolerant to Crestor and atorvastatin.  He is on pravastatin as well as colestipol.  His LDL recently obtained was 81 with an ideal goal of less than 70.  He is going to check on the potential cost of Zetia and be back in touch with me.  We talked about ongoing prudent diet and exercise.    3.  Work lifting restriction.  He is interested in having the weight restriction increased so that he can utilize a machine that requires 2 people to lift.  He is going to give this activity try and let me know how it goes.  We talked about avoiding heavy lifting in general because of the increased shear forces in the coronary arteries.  I think however that he can certainly be cleared to do this activity on an infrequent basis.      Plan: As outlined above with follow-up in 4 months.    1. ST elevation (STEMI) myocardial infarction (H)     2. Coronary artery disease involving coronary bypass graft of native heart with angina pectoris (H)     3. S/P CABG x 4 (2009)     4. Dyslipidemia, goal LDL below 70           An After Visit Summary was printed and given to the patient.    Subjective:    Hari Montgomery is a 58 y.o. male who returned for a planned  follow up visit.  He reports he continues to feel well.  He has had no complaints of  angina, shortness of breath, exercise intolerance.  He does tell me that he would like to increase the lifting restriction as there is a certain machine that he is required to utilize at work that would require him to lift in combination with another individual approximately 60 pounds.  He was going to get the weight of the machine.    He presented with ST segment myocardial infarction in the inferior leads March 2018.  Coronary angiogram demonstrated occlusion of the left anterior descending in its mid vessel, occlusion of the distal right coronary artery, occlusion of the circumflex filling the patent obtuse marginal branches, patent graft to the LAD, patent graft to the obtuse marginal, patent vein graft to a diagonal that was ectatic.  The right internal mammary artery to the right coronary artery was not well visualized he had successful percutaneous intervention to the native right coronary artery.    Chest echocardiography March 2018 demonstrated normal systolic function with mild basilar inferior wall motion abnormality.  He had blunted heart rate response to exercise with the stress echocardiogram.  Review of Systems:   General: WNL  Eyes: WNL  Ears/Nose/Throat: WNL  Lungs: WNL  Heart: WNL  Stomach: WNL  Bladder: WNL  Muscle/Joints: WNL  Skin: WNL  Nervous System: WNL  Mental Health: WNL     Blood: WNL      Problem List:    Patient Active Problem List   Diagnosis   ? ST elevation (STEMI) myocardial infarction (H)   ? Coronary artery disease involving coronary bypass graft of native heart with angina pectoris (H)   ? S/P CABG x 4 (2009)   ? Dyslipidemia, goal LDL below 70   ? Obesity (BMI 30-39.9)   ? Hyperglycemia       Social History     Socioeconomic History   ? Marital status:      Spouse name: Not on file   ? Number of children: Not on file   ? Years of education: Not on file   ? Highest education level: Not on file   Social Needs   ? Financial resource strain: Not on file   ? Food insecurity -  worry: Not on file   ? Food insecurity - inability: Not on file   ? Transportation needs - medical: Not on file   ? Transportation needs - non-medical: Not on file   Occupational History   ? Not on file   Tobacco Use   ? Smoking status: Never Smoker   ? Smokeless tobacco: Never Used   Substance and Sexual Activity   ? Alcohol use: No   ? Drug use: No   ? Sexual activity: Not on file   Other Topics Concern   ? Not on file   Social History Narrative   ? Not on file       No family history on file.    Current Outpatient Medications   Medication Sig Dispense Refill   ? acetaminophen (TYLENOL) 325 MG tablet Take 650 mg by mouth at bedtime.     ? aspirin 81 mg chewable tablet Chew 1 tablet (81 mg total) daily.  0   ? BRILINTA 90 mg Tab Take 90 mg by mouth 2 (two) times a day.  11   ? colestipol (COLESTID) 1 gram tablet Take 2 g by mouth daily with supper.     ? coQ10, ubiquinol, 200 mg cap Take 600 mg by mouth daily.     ? cyclobenzaprine (FLEXERIL) 10 MG tablet Take 10 mg by mouth 3 (three) times a day as needed.  0   ? lisinopril (PRINIVIL,ZESTRIL) 5 MG tablet Take 1 tablet (5 mg total) by mouth daily. 90 tablet 5   ? methylPREDNISolone (MEDROL DOSEPACK) 4 mg tablet Take 4 mg by mouth. Follow package directions     ? metoprolol succinate (TOPROL-XL) 50 MG 24 hr tablet Take 50 mg by mouth daily.     ? pravastatin (PRAVACHOL) 80 MG tablet Take 80 mg by mouth at bedtime.  1   ? atorvastatin (LIPITOR) 80 MG tablet Take 1 tablet (80 mg total) by mouth daily. 90 tablet 3   ? lisinopril (PRINIVIL,ZESTRIL) 5 MG tablet Take 1 tablet (5 mg total) by mouth daily. 30 tablet 0   ? metoprolol succinate (TOPROL-XL) 50 MG 24 hr tablet Take 1 tablet (50 mg total) by mouth daily. 30 tablet 0   ? nitroglycerin (NITROSTAT) 0.3 MG SL tablet Place 1 tablet (0.3 mg total) under the tongue every 5 (five) minutes as needed for chest pain. 1 Bottle 0   ? ticagrelor (BRILINTA) 90 mg Tab Take 1 tablet (90 mg total) by mouth 2 (two) times a day. 60  "tablet 11     No current facility-administered medications for this visit.        Objective:     /80 (Patient Site: Left Arm, Patient Position: Sitting, Cuff Size: Adult Regular)   Pulse 65   Resp 12   Ht 5' 8.25\" (1.734 m)   Wt (!) 257 lb (116.6 kg)   BMI 38.79 kg/m    (!) 257 lb (116.6 kg)       Physical Exam:    GENERAL APPEARANCE: alert, no apparent distress  HEENT: no scleral icterus or xanthelasma  NECK: jugular venous pressure within normal limits  CHEST: symmetric, the lungs are clear to auscultation  CARDIOVASCULAR: regular rhythm without murmur, click, or gallop; no carotid bruits  Abdomen: No Organomegaly, masses, bruits, or tenderness. Bowels sounds are present      EXTREMITIES: no cyanosis, clubbing or edema    Cardiac Testing:     Summary        No previous study for comparison. Technically difficult study.    Left ventricle ejection fraction is normal. The calculated left ventricular ejection fraction is 58%.    Normal left ventricular size and systolic function.    Mild basal to mid inferior wall hypokinesis. Mild concentric hypertrophy noted.      Hari Montgomery is a 57 y.o. old male with CAD s/p ACS and CABG x4 w/ L-LAD, V-D, V-OM, ?WILL-RCA and RCA endarterectomy, obesity, family h/o CAD who is here for inferior STEMI.      Findings:  LM:no obstruction  LAD:occluded in mid-vessel, fills distally via patent L-LAD, w/ mild diffuse disease in distal vessel.   RC: severe diffuse disease in proximal portion, small caliber, similar to prior angio  Lcx:occluded in mid-vessel. Fills distally via patent V-OM  RCA:dominant, occluded in distal vessel     Grafts:  L-LAD: patent  SVG-OM: patent  SVG-D: patent, ectatic distally  WILL-RCA: not visualized on selective, non-selective in-situ, and aortogram injections     Access:  R Femoral artery     PCI:  RCA was engaged w/ a 6F JR4 Guide catheter and the lesion in dRCA was difficult to wire w/ a [place], so it was instead wired w/ a Turnpike-supported " Whisper J wire confirming luminal location via careful injection through the microcatheter. W/ the use of a trapper balloon the wire was exchanged for a Forte and dRCA was ballooned w/ a 2.0x12mm Emerge balloon and stented w/ a [id number] Synergy EES at 12 tk. Final angiography showed no dissection or perforation and a CHIKIS 3 flow.     Closure:   Perclose     Impression/plan:  Pt. is a 57 y.o. old male with CAD s/p ACS and CABG x4 w/ L-LAD, V-D, V-OM, ?WILL-RCA and RCA endarterectomy, obesity, family h/o CAD who is here for inferior STEMI.  - multi-vessel CAD, patent L-LAD, V-D, V-OM; dRCA s/p endarterectomy and WILL-RCA. Unable to visualize R-RCA, but native RCA is more likely to be the true JEROME. Suspect that WILL may not have matured due to successful endarterectomy  - in either case, now s/p successful PCI to native RCA w/ EESx1 w/ resolution of ST elevations and CP  - ASA 81mg daily indefinitely, ticagrelor 180mg once, followed by 90mg twice daily for at least 12 months  - TTE in am, atorva 80. Hold off on BB/ACEI/nitrates tonight but plan to at least start BB this admission  - continue aggressive risk factor modification       Patient Information     Patient Name  Hari Montgomery MRN  [id number] Sex  Male              Age  1960 (58 y.o.)   EKG Scan       Scan on: 18 10:42 AM by:    Indications     CAD (coronary artery disease)   Interpretation Summary       The stress electrocardiogram was non-diagnostic given patient only obtain 65% of target heart rate.    Left ventricle ejection fraction is normal. The estimated left ventricular ejection fraction is 55%.    Stress images are of poor quality.                Lab Results:    Lab Results   Component Value Date     2019    K 4.2 2019     2019    CO2 24 2019    BUN 20 2019    CREATININE 0.67 (L) 2019    CALCIUM 8.9 2019     Lab Results   Component Value Date    CHOL 150 2019    TRIG 160 (H)  01/08/2019    HDL 37 (L) 01/08/2019     No results found for: BNP  Creatinine (mg/dL)   Date Value   01/08/2019 0.67 (L)   09/17/2018 0.71   06/06/2018 0.66 (L)   03/26/2018 0.77     LDL Calculated (mg/dL)   Date Value   01/08/2019 81   09/25/2018 88   03/20/2018 71     Lab Results   Component Value Date    WBC 6.7 03/19/2018    HGB 13.5 (L) 03/20/2018    HCT 46.1 03/19/2018    MCV 92 03/19/2018     03/21/2018               This note has been dictated using voice recognition software. Any grammatical or context distortions are unintentional and inherent to the software.      Soren Joseph M.D., F.A.C.C.  [facility]  [phone]

## 2021-06-28 NOTE — PROGRESS NOTES
Progress Notes by Soren Joseph MD at 3/16/2020  8:30 AM     Author: Soren Joseph MD Service: -- Author Type: Physician    Filed: 4/24/2020  8:40 AM Encounter Date: 3/16/2020 Status: Signed    : Soren Joseph MD (Physician)             [facility] Progress Note    Assessment:  1.  Coronary artery disease with prior bypass surgery.  Acute ST segment myocardial infarction March 2018.  He is doing well clinically and is lost weight and following a more prudent diet.  He is going to continue with the current combination of medications and we are going to plan follow-up stress testing in the form of an exercise stress echocardiogram given his job requirements.    2.  Dyslipidemia.  He has been intolerant to Crestor and atorvastatin but tolerating pravastatin and Zetia.  We are going to plan follow-up lipid and liver panel as well as hemoglobin A1c given history of mild elevation in blood sugar.  He is on lisinopril, metoprolol as well as pravastatin Zetia and aspirin.      Plan: As outlined above with follow-up in 6 months.    1. CAD (coronary artery disease)  Comprehensive metabolic panel    Lipid Profile    Glycosylated Hemoglobin A1C    Echo Stress Exercise   2. Coronary artery disease involving coronary bypass graft of native heart with angina pectoris (H)     3. Dyslipidemia, goal LDL below 70           An After Visit Summary was printed and given to the patient.    Subjective:    Hari Mongtomery is a 59 y.o. male who returned for a planned  follow up visit.  He reports that he has been feeling well.  He has had no complaints of angina, shortness of breath or exercise intolerance.  He has a job that requires increased physical activity and lifting.  We are going to plan follow-up stress testing.    He has a history of coronary artery bypass grafting with acute myocardial infarction March 2018.  Coronary angiogram demonstrated occlusion of the LAD in its mid vessel, occlusion of  the distal right coronary artery, occlusion of the circumflex, patent graft to the LAD, patent graft to the obtuse marginal, patent vein graft to a diagonal that was ectatic.  The right internal mammary artery to the right coronary artery was not well visualized with successful stenting to the native right coronary artery.    Rest echocardiogram from March 2018 demonstrated normal systolic function with mild basilar inferior wall motion abnormality with blunted heart rate response to exercise.    He reports that he is been following a prudent diet and has lost weight.    Review of Systems:   General: WNL  Eyes: WNL  Ears/Nose/Throat: WNL  Lungs: WNL  Heart: WNL  Stomach: WNL  Bladder: WNL  Muscle/Joints: WNL  Skin: WNL  Nervous System: WNL  Mental Health: WNL     Blood: WNL      Problem List:    Patient Active Problem List   Diagnosis   ? ST elevation (STEMI) myocardial infarction (H)   ? Coronary artery disease involving coronary bypass graft of native heart with angina pectoris (H)   ? S/P CABG x 4 (2009)   ? Dyslipidemia, goal LDL below 70   ? Obesity (BMI 30-39.9)   ? Hyperglycemia       Social History     Socioeconomic History   ? Marital status:      Spouse name: Not on file   ? Number of children: Not on file   ? Years of education: Not on file   ? Highest education level: Not on file   Occupational History   ? Not on file   Social Needs   ? Financial resource strain: Not on file   ? Food insecurity     Worry: Not on file     Inability: Not on file   ? Transportation needs     Medical: Not on file     Non-medical: Not on file   Tobacco Use   ? Smoking status: Never Smoker   ? Smokeless tobacco: Never Used   Substance and Sexual Activity   ? Alcohol use: No   ? Drug use: No   ? Sexual activity: Not on file   Lifestyle   ? Physical activity     Days per week: Not on file     Minutes per session: Not on file   ? Stress: Not on file   Relationships   ? Social connections     Talks on phone: Not on file      "Gets together: Not on file     Attends Sikhism service: Not on file     Active member of club or organization: Not on file     Attends meetings of clubs or organizations: Not on file     Relationship status: Not on file   ? Intimate partner violence     Fear of current or ex partner: Not on file     Emotionally abused: Not on file     Physically abused: Not on file     Forced sexual activity: Not on file   Other Topics Concern   ? Not on file   Social History Narrative   ? Not on file       No family history on file.    Current Outpatient Medications   Medication Sig Dispense Refill   ? acetaminophen (TYLENOL) 325 MG tablet Take 650 mg by mouth at bedtime.     ? aspirin 325 MG tablet Take 325 mg by mouth daily.     ? coQ10, ubiquinol, 200 mg cap Take 600 mg by mouth daily.     ? cyclobenzaprine (FLEXERIL) 10 MG tablet Take 10 mg by mouth 3 (three) times a day as needed.  0   ? ezetimibe (ZETIA) 10 mg tablet TAKE 1 TABLET BY MOUTH EVERY DAY - DISCONTINUE COLESTIPOL 90 tablet 0   ? lisinopriL (PRINIVIL,ZESTRIL) 5 MG tablet TAKE 1 TABLET BY MOUTH EVERY DAY 90 tablet 0   ? metoprolol succinate (TOPROL-XL) 50 MG 24 hr tablet Take 50 mg by mouth daily.     ? pravastatin (PRAVACHOL) 80 MG tablet TAKE 1 TABLET BY MOUTH EVERYDAY AT BEDTIME 90 tablet 0   ? nitroglycerin (NITROSTAT) 0.3 MG SL tablet Place 1 tablet (0.3 mg total) under the tongue every 5 (five) minutes as needed for chest pain. 1 Bottle 0     No current facility-administered medications for this visit.        Objective:     /76 (Patient Site: Right Arm, Patient Position: Sitting, Cuff Size: Adult Large)   Pulse 63   Resp 16   Ht 5' 8.25\" (1.734 m)   Wt (!) 244 lb (110.7 kg)   BMI 36.83 kg/m    (!) 244 lb (110.7 kg)   [unfilled]  Wt Readings from Last 3 Encounters:   03/16/20 (!) 244 lb (110.7 kg)   01/11/19 (!) 257 lb (116.6 kg)   08/31/18 (!) 253 lb (114.8 kg)       Physical Exam:    GENERAL APPEARANCE: alert, no apparent distress  HEENT: no " scleral icterus or xanthelasma  NECK: jugular venous pressure within normal limits  CHEST: symmetric, the lungs are clear to auscultation, chest wall scar well-healed  CARDIOVASCULAR: regular rhythm without murmur, click, or gallop; no carotid bruits  Abdomen: No Organomegaly, masses, bruits, or tenderness. Bowels sounds are present      EXTREMITIES: no cyanosis, clubbing or edema    Cardiac Testing:  Reading physician: Ozzie Tejeda DO  Ordering physician: Soren Joseph MD  Study date: 18    Performing Date  Performing Department    Sep 21, 2018   CARDIAC TESTING [[id number]]    Patient Information     Patient Name   Hari Montgomery  MRN   [id number]  Sex   Male   1   1960 (57 y.o.)    EKG Scan      Stress Test Data - Scan on 18 10:42 AM by     Indications     CAD (coronary artery disease)    Interpretation Summary       The stress electrocardiogram was non-diagnostic given patient only obtain 65% of target heart rate.    Left ventricle ejection fraction is normal. The estimated left ventricular ejection fraction is 55%.    Stress images are of poor quality.        Provider  Role    Jesus Sepulveda MD  Primary     Procedure  Laterality  Anesthesia    Coronary Angiogram  N/A  Conscious Sedation (RN)    Percutaneous Coronary Intervention  N/A  Conscious Sedation (RN)          Pre Procedure Diagnosis     STEMI    Indications     ST elevation (STEMI) myocardial infarction (H) [I21.3 (ICD-10-CM)]     Procedure Status     Procedure scheduled as Emergent (STEMI).          Conclusion          Estimated blood loss was <20 ml.    The LM vessel was injected.    The LAD vessel was injected .    The ramus vessel was injected.    The left circumflex was injected.    The RCA was injected.    Dist RCA lesion 100% stenosed.     Hari Montgomery is a 57 y.o. old male with CAD s/p ACS and CABG x4 w/ L-LAD, V-D, V-OM, ?WILL-RCA and RCA endarterectomy, obesity, family h/o CAD who is here for inferior  STEMI.      Findings:  LM:no obstruction  LAD:occluded in mid-vessel, fills distally via patent L-LAD, w/ mild diffuse disease in distal vessel.   RC: severe diffuse disease in proximal portion, small caliber, similar to prior angio  Lcx:occluded in mid-vessel. Fills distally via patent V-OM  RCA:dominant, occluded in distal vessel     Grafts:  L-LAD: patent  SVG-OM: patent  SVG-D: patent, ectatic distally  WILL-RCA: not visualized on selective, non-selective in-situ, and aortogram injections     Access:  R Femoral artery     PCI:  RCA was engaged w/ a 6F JR4 Guide catheter and the lesion in dRCA was difficult to wire w/ a Forte, so it was instead wired w/ a Turnpike-supported Whisper J wire confirming luminal location via careful injection through the microcatheter. W/ the use of a trapper balloon the wire was exchanged for a Forte and dRCA was ballooned w/ a 2.0x12mm Emerge balloon and stented w/ a [id number] Synergy EES at 12 tk. Final angiography showed no dissection or perforation and a CHIKIS 3 flow.     Closure:   Perclose     Impression/plan:  Pt. is a 57 y.o. old male with CAD s/p ACS and CABG x4 w/ L-LAD, V-D, V-OM, ?WILL-RCA and RCA endarterectomy, obesity, family h/o CAD who is here for inferior STEMI.  - multi-vessel CAD, patent L-LAD, V-D, V-OM; dRCA s/p endarterectomy and WILL-RCA. Unable to visualize R-RCA, but native RCA is more likely to be the true JEROME. Suspect that WILL may not have matured due to successful endarterectomy  - in either case, now s/p successful PCI to native RCA w/ EESx1 w/ resolution of ST elevations and CP  - ASA 81mg daily indefinitely, ticagrelor 180mg once, followed by 90mg twice daily for at least 12 months  - TTE in am, atorva 80. Hold off on BB/ACEI/nitrates tonight but plan to at least start BB this admission  - continue aggressive risk factor modification         Lab Results:    Lab Results   Component Value Date     01/08/2019    K 4.2 01/08/2019      01/08/2019    CO2 24 01/08/2019    BUN 20 01/08/2019    CREATININE 0.67 (L) 01/08/2019    CALCIUM 8.9 01/08/2019     Lab Results   Component Value Date    CHOL 160 04/05/2019    TRIG 178 (H) 04/05/2019    HDL 47 04/05/2019     No results found for: BNP  Creatinine (mg/dL)   Date Value   01/08/2019 0.67 (L)   09/17/2018 0.71   06/06/2018 0.66 (L)   03/26/2018 0.77     LDL Calculated (mg/dL)   Date Value   04/05/2019 77   01/08/2019 81   09/25/2018 88     Lab Results   Component Value Date    WBC 6.7 03/19/2018    HGB 13.5 (L) 03/20/2018    HCT 46.1 03/19/2018    MCV 92 03/19/2018     03/21/2018         This note has been dictated using voice recognition software. Any grammatical or context distortions are unintentional and inherent to the software.

## 2021-06-30 NOTE — PROGRESS NOTES
Progress Notes by Soren Joseph MD at 3/26/2021  8:50 AM     Author: Soren Joseph MD Service: -- Author Type: Physician    Filed: 3/26/2021  9:16 AM Encounter Date: 3/26/2021 Status: Signed    : Soren Joseph MD (Physician)             [facility] Progress Note    Assessment:  1.  Coronary artery disease with prior bypass surgery and acute myocardial infarction March 2018 with intervention to native right coronary artery.  He is doing well clinically.  He has lost some weight and his hemoglobin A1c has improved.  He does have somewhat of an active job and we are going to plan follow-up stress test in the form of an exercise stress echocardiogram.  We talked about ongoing prudent diet and exercise.  Talked about symptoms to monitor for.    2.  Dyslipidemia.  As noted he has been intolerant to Crestor and atorvastatin but is tolerant of pravastatin and Zetia.  I have reviewed laboratory studies from his primary care provider Dr. Mansfield.  The cholesterol numbers were higher in November 2020 than March 2020 and that his total cholesterol was 173 although triglycerides improved to 157 the LDL was 90.  Ideally would like to see the LDL less than 70.  He is going to have a repeat lipid panel when he comes for the exercise stress echocardiogram.      Plan: 1.  Stress echocardiogram.  2.  Fasting lipid panel.  3.  Follow-up in 6 to 8 months.    1. CAD (coronary artery disease)  Echo Stress Exercise    Lipid Profile   2. S/P CABG x 4 (2009)     3. Dyslipidemia, goal LDL below 70     4. Coronary artery disease involving coronary bypass graft of native heart with angina pectoris (H)           An After Visit Summary was printed and given to the patient.    Subjective:    Hari Montgomery is a 60 y.o. male who returned for a planned  follow up visit. he has a history of coronary artery bypass grafting with acute myocardial infarction March 2018.  Coronary angiogram demonstrated occlusion of  the LAD in its mid vessel, occlusion of the distal right coronary artery, occlusion of the circumflex, patent graft to the LAD, patent graft to the obtuse marginal, patent vein graft to a diagonal that was ectatic.  The right internal mammary artery to the right coronary artery was not well visualized with successful stenting to the native right coronary artery.     Stress echocardiogram from March 2018 demonstrated normal systolic function with mild basilar inferior wall motion abnormality with blunted heart rate response to exercise.  He reports feeling well.  He is not describing any anginal chest discomfort, shortness of breath, dizziness or lightheadedness.  I did review laboratory studies that were obtained from his primary care physician in November.  Hemoglobin A1c was down to 6.8.  However his LDL cholesterol was higher at 90 compared to 71 March 2020.  He has been intolerant to both rosuvastatin and atorvastatin but tolerates pravastatin in combination with Zetia.    Review of Systems:   General: WNL  Eyes: WNL  Ears/Nose/Throat: WNL  Lungs: WNL  Heart: WNL  Stomach: WNL  Bladder: WNL  Muscle/Joints: WNL  Skin: WNL  Nervous System: WNL  Mental Health: WNL     Blood: WNL      Problem List:    Patient Active Problem List   Diagnosis   ? ST elevation (STEMI) myocardial infarction (H)   ? Coronary artery disease involving coronary bypass graft of native heart with angina pectoris (H)   ? S/P CABG x 4 (2009)   ? Dyslipidemia, goal LDL below 70   ? Obesity (BMI 30-39.9)   ? Hyperglycemia   ? Obesity (BMI 35.0-39.9) with comorbidity (H)   ? Diabetes mellitus, type 2 (H)       Social History     Socioeconomic History   ? Marital status:      Spouse name: Not on file   ? Number of children: Not on file   ? Years of education: Not on file   ? Highest education level: Not on file   Occupational History   ? Not on file   Social Needs   ? Financial resource strain: Not on file   ? Food insecurity     Worry: Not  on file     Inability: Not on file   ? Transportation needs     Medical: Not on file     Non-medical: Not on file   Tobacco Use   ? Smoking status: Never Smoker   ? Smokeless tobacco: Never Used   Substance and Sexual Activity   ? Alcohol use: No   ? Drug use: No   ? Sexual activity: Not on file   Lifestyle   ? Physical activity     Days per week: Not on file     Minutes per session: Not on file   ? Stress: Not on file   Relationships   ? Social connections     Talks on phone: Not on file     Gets together: Not on file     Attends Islam service: Not on file     Active member of club or organization: Not on file     Attends meetings of clubs or organizations: Not on file     Relationship status: Not on file   ? Intimate partner violence     Fear of current or ex partner: Not on file     Emotionally abused: Not on file     Physically abused: Not on file     Forced sexual activity: Not on file   Other Topics Concern   ? Not on file   Social History Narrative   ? Not on file       No family history on file.    Current Outpatient Medications   Medication Sig Dispense Refill   ? acetaminophen (TYLENOL) 325 MG tablet Take 650 mg by mouth at bedtime.     ? aspirin 325 MG tablet Take 325 mg by mouth daily.     ? CONTOUR NEXT METER Misc Use As Directed.     ? CONTOUR NEXT TEST STRIPS strips USE AS DIRECTED 3 TIMES A DAY     ? coQ10, ubiquinol, 200 mg cap Take 600 mg by mouth daily.     ? ezetimibe (ZETIA) 10 mg tablet TAKE 1 TABLET BY MOUTH EVERY DAY - DISCONTINUE COLESTIPOL 90 tablet 2   ? lisinopriL (PRINIVIL,ZESTRIL) 5 MG tablet TAKE 1 TABLET BY MOUTH EVERY DAY 90 tablet 0   ? metFORMIN (GLUCOPHAGE-XR) 500 MG 24 hr tablet Take 1,000 mg by mouth 2 (two) times a day.     ? metoprolol succinate (TOPROL-XL) 50 MG 24 hr tablet TAKE 1 TABLET BY MOUTH EVERY DAY 90 tablet 0   ? MICROLET LANCET      ? nitroglycerin (NITROSTAT) 0.3 MG SL tablet Place 1 tablet (0.3 mg total) under the tongue every 5 (five) minutes as needed for  "chest pain. 1 Bottle 0   ? pravastatin (PRAVACHOL) 80 MG tablet TAKE 1 TABLET BY MOUTH EVERYDAY AT BEDTIME 90 tablet 0   ? cyclobenzaprine (FLEXERIL) 10 MG tablet Take 10 mg by mouth 3 (three) times a day as needed.  0     No current facility-administered medications for this visit.        Objective:     /80 (Patient Site: Right Arm, Patient Position: Sitting, Cuff Size: Adult Large)   Pulse 64   Resp 16   Ht 5' 8.25\" (1.734 m)   Wt (!) 243 lb 6.4 oz (110.4 kg)   BMI 36.74 kg/m    (!) 243 lb 6.4 oz (110.4 kg)   [unfilled]  Wt Readings from Last 3 Encounters:   21 (!) 243 lb 6.4 oz (110.4 kg)   20 (!) 244 lb (110.7 kg)   19 (!) 257 lb (116.6 kg)       Physical Exam:    GENERAL APPEARANCE: alert, no apparent distress  HEENT: no scleral icterus or xanthelasma  NECK: jugular venous pressure within normal limits  CHEST: symmetric, the lungs are clear to auscultation chest wall scar well-healed  CARDIOVASCULAR: regular rhythm without murmur, click, or gallop; no carotid bruits  Abdomen: No Organomegaly, masses, bruits, or tenderness. Bowels sounds are present      EXTREMITIES: no cyanosis, clubbing or edema, good distal pulses.    Cardiac Testing:  Echo Stress Exercise  Order# [id number]  Reading physician: Ozzie Tejeda DO Ordering physician: Soren Joseph MD Study date: 18   Performing Date Performing Department   Sep 21, 2018  CARDIAC TESTING [[id number]]   Patient Information    Patient Name   Hari Montgomery MRN   [id number] Sex   Male  1   1960 (57 y.o.)   EKG Scan     Stress Test Data - Scan on 18 10:42 AM by    Indications    CAD (coronary artery disease)   Interpretation Summary      The stress electrocardiogram was non-diagnostic given patient only obtain 65% of target heart rate.    Left ventricle ejection fraction is normal. The estimated left ventricular ejection fraction is 55%.    Stress images are of poor quality.        Percutaneous Coronary " Intervention   Panel Information    Panel 1    Provider Role   Jesus Sepulveda MD Primary    Procedure Laterality Anesthesia   Coronary Angiogram N/A Conscious Sedation (RN)   Percutaneous Coronary Intervention N/A Conscious Sedation (RN)         Pre Procedure Diagnosis    STEMI   Indications    ST elevation (STEMI) myocardial infarction (H) [I21.3 (ICD-10-CM)]    Procedure Status    Procedure scheduled as Emergent (STEMI).         Conclusion         Estimated blood loss was <20 ml.    The LM vessel was injected.    The LAD vessel was injected .    The ramus vessel was injected.    The left circumflex was injected.    The RCA was injected.    Dist RCA lesion 100% stenosed.     Hari Montgomery is a 57 y.o. old male with CAD s/p ACS and CABG x4 w/ L-LAD, V-D, V-OM, ?WILL-RCA and RCA endarterectomy, obesity, family h/o CAD who is here for inferior STEMI.      Findings:  LM:no obstruction  LAD:occluded in mid-vessel, fills distally via patent L-LAD, w/ mild diffuse disease in distal vessel.   RC: severe diffuse disease in proximal portion, small caliber, similar to prior angio  Lcx:occluded in mid-vessel. Fills distally via patent V-OM  RCA:dominant, occluded in distal vessel     Grafts:  L-LAD: patent  SVG-OM: patent  SVG-D: patent, ectatic distally  WILL-RCA: not visualized on selective, non-selective in-situ, and aortogram injections     Access:  R Femoral artery     PCI:  RCA was engaged w/ a 6F JR4 Guide catheter and the lesion in dRCA was difficult to wire w/ a Forte, so it was instead wired w/ a Turnpike-supported Whisper J wire confirming luminal location via careful injection through the microcatheter. W/ the use of a trapper balloon the wire was exchanged for a Forte and dRCA was ballooned w/ a 2.0x12mm Emerge balloon and stented w/ a [id number] Synergy EES at 12 tk. Final angiography showed no dissection or perforation and a CHIKIS 3 flow.     Closure:   Perclose     Impression/plan:  Pt. is a 57 y.o. old male  with CAD s/p ACS and CABG x4 w/ L-LAD, V-D, V-OM, ?WILL-RCA and RCA endarterectomy, obesity, family h/o CAD who is here for inferior STEMI.  - multi-vessel CAD, patent L-LAD, V-D, V-OM; dRCA s/p endarterectomy and WILL-RCA. Unable to visualize R-RCA, but native RCA is more likely to be the true JEROME. Suspect that WILL may not have matured due to successful endarterectomy  - in either case, now s/p successful PCI to native RCA w/ EESx1 w/ resolution of ST elevations and CP  - ASA 81mg daily indefinitely, ticagrelor 180mg once, followed by 90mg twice daily for at least 12 months  - TTE in am, atorva 80. Hold off on BB/ACEI/nitrates tonight but plan to at least start BB this admission  - continue aggressive risk factor modification         Lab Results:    Lab Results   Component Value Date     11/30/2020    K 4.2 11/30/2020     11/30/2020    CO2 28 11/30/2020    BUN 18 11/30/2020    CREATININE 0.72 11/30/2020    CALCIUM 9.6 11/30/2020     Lab Results   Component Value Date    CHOL 173 11/30/2020    TRIG 157 (H) 11/30/2020    HDL 52 11/30/2020     No results found for: BNP  Creatinine (mg/dL)   Date Value   11/30/2020 0.72   03/16/2020 0.80   01/08/2019 0.67 (L)   09/17/2018 0.71     LDL Calculated (mg/dL)   Date Value   11/30/2020 90   03/16/2020 71   04/05/2019 77     Lab Results   Component Value Date    WBC 6.7 03/19/2018    HGB 13.5 (L) 03/20/2018    HCT 46.1 03/19/2018    MCV 92 03/19/2018     03/21/2018         This note has been dictated using voice recognition software. Any grammatical or context distortions are unintentional and inherent to the software.

## 2022-01-11 ENCOUNTER — TELEPHONE (OUTPATIENT)
Dept: CARDIOLOGY | Facility: CLINIC | Age: 62
End: 2022-01-11
Payer: COMMERCIAL

## 2022-01-11 DIAGNOSIS — E78.5 DYSLIPIDEMIA, GOAL LDL BELOW 70: ICD-10-CM

## 2022-01-11 DIAGNOSIS — I25.709 CORONARY ARTERY DISEASE INVOLVING CORONARY BYPASS GRAFT OF NATIVE HEART WITH ANGINA PECTORIS (H): ICD-10-CM

## 2022-01-11 RX ORDER — EZETIMIBE 10 MG/1
TABLET ORAL
Qty: 90 TABLET | Refills: 0 | Status: SHIPPED | OUTPATIENT
Start: 2022-01-11 | End: 2022-01-17

## 2022-01-11 NOTE — TELEPHONE ENCOUNTER
Pt LVM asking for refill of ezetimibe.  Refill sent for 90 days.  Pt overdue for office visit.  Message sent to scheduling.  cortes

## 2022-01-12 NOTE — TELEPHONE ENCOUNTER
----- Message from Elba Munoz sent at 1/11/2022 10:43 AM CST -----  Regarding: RE: MDG  I left a message to schedule - thank you     ----- Message -----  From: Luma Solomon RN  Sent: 1/11/2022   9:57 AM CST  To: [facility] Scheduling Registration [facility]  Subject: MDG                                              Please reach out to pt - overdue for office visit.  Thanks - luma brooks

## 2022-01-17 ENCOUNTER — OFFICE VISIT (OUTPATIENT)
Dept: CARDIOLOGY | Facility: CLINIC | Age: 62
End: 2022-01-17
Payer: COMMERCIAL

## 2022-01-17 VITALS
BODY MASS INDEX: 34.11 KG/M2 | WEIGHT: 226 LBS | DIASTOLIC BLOOD PRESSURE: 64 MMHG | SYSTOLIC BLOOD PRESSURE: 118 MMHG | HEART RATE: 68 BPM | RESPIRATION RATE: 16 BRPM

## 2022-01-17 DIAGNOSIS — E78.5 DYSLIPIDEMIA, GOAL LDL BELOW 70: ICD-10-CM

## 2022-01-17 DIAGNOSIS — I25.709 CORONARY ARTERY DISEASE INVOLVING CORONARY BYPASS GRAFT OF NATIVE HEART WITH ANGINA PECTORIS (H): ICD-10-CM

## 2022-01-17 PROCEDURE — 99214 OFFICE O/P EST MOD 30 MIN: CPT | Performed by: INTERNAL MEDICINE

## 2022-01-17 RX ORDER — EZETIMIBE 10 MG/1
TABLET ORAL
Qty: 90 TABLET | Refills: 4 | Status: SHIPPED | OUTPATIENT
Start: 2022-01-17 | End: 2022-06-14

## 2022-01-17 NOTE — LETTER
1/17/2022    Shane Mansfield MD  [facility] [address]  [facility] [address]    RE: Hari Montgomery       Dear Colleague,     I had the pleasure of seeing Hari Montgomery in the [facility] [place].    HEART CARE ENCOUNTER CONSULTATON NOTE      HERIBERTO [facility] [place]  [phone]      Assessment/Recommendations   Assessment/Plan:  1.  Coronary artery disease with prior bypass surgery and acute myocardial infarction March 2018 with intervention to the native right coronary artery as outlined below.  Clinically doing well.  He has done a nice job with respect to modifying his risks and and working on diet and more regular exercise.  We discussed follow-up stress testing and discussed the stress test from April 2021.  At this point he wanted to hold on additional stress testing.  I did suggest the possibility of cutting back on the metoprolol given the blunted heart rate response to exercise which is asymptomatic.  At this point his preference was to continue with the current combination of medications as he is asymptomatic.    2.  Hypertension.  Blood pressure appears to be under good control 2.  I have reviewed recent blood pressure notes from his primary care provider as well as blood pressure today.    3.  Dyslipidemia.  He has been intolerant to Crestor and atorvastatin but is tolerant of pravastatin and Zetia.  As noted above lipids and hemoglobin A1c from April 2021 are reviewed.  We are going to plan follow-up laboratory studies in 1 month as he did run out of Zetia for a few days.  I renewed the Zetia and asked him to avoid running out of medications and I have given him my nurses telephone number.    1.  Lipid, comprehensive metabolic profile and hemoglobin A1c in 1 month.  2.  Follow-up in 6 months or sooner if specific issues or questions arise.       History of Present Illness/Subjective    HPI: Hari Montgomery is a 61 year old male who is seen in follow up.He has a history of  coronary artery bypass grafting with acute myocardial infarction March 2018.  Coronary angiogram demonstrated occlusion of the LAD in its mid vessel, occlusion of the distal right coronary artery, occlusion of the circumflex, patent graft to the LAD, patent graft to the obtuse marginal, patent vein graft to a diagonal that was ectatic.  The right internal mammary artery to the right coronary artery was not well visualized with successful stenting to the native right coronary artery.     Stress echocardiogram most recently from April 2021 was nondiagnostic secondary to significant blunting of his heart rate with exercise.  He took his metoprolol that morning per his recall.  Left-ventricular function was said to be normal without significant valve abnormality without signs of ischemia at the heart rate achieved.    He reports today that he has been feeling well from a cardiovascular standpoint.  He has had no anginal chest discomfort or use of nitroglycerin.  He has lost approximately 20 pounds and is feeling well.  He does continue to try to be careful with respect to heavy lifting especially in the cold weather.  I have reviewed most recent lipids that are from  April 2021 where total cholesterol was 149 down from 173 and LDL 69 down from 90.  This triglycerides are 163 and a hemoglobin A1c through his primary care physician's office was reported to be 6.4.  He indicates that he ran out of Zetia within the past few days and I encouraged him not to run out of medications and to contact us if he is in need of refills.  No symptoms of claudication.  Recent Echocardiogram Results:  ding Physician Reading Date Result Priority   Walker Gayle MD  [phone] 4/8/2021 Routine   Provider, Historical 4/8/2021      Narrative & Impression    An exercise echocardiogram stress test was performed following a Toni protocol with the patient exercising for 7 minutes and 5 seconds    The patient's exercise tolerance was  normal.    Heart rate demonstrated a blunted response to exercise, only achieving 61% of age-predicted maximum heart rate.    The stress electrocardiogram an echocardiogram are non-diagnostic due to failure to achieve target heart rate. No signs of ischemia noted at heart rate achieved.    Left ventricle ejection fraction is normal. The estimated left ventricular ejection fraction is 60%.    No significant valve disease identified on screening echo images.    When compared to the previous study dated 9/21/2018, no significant change.         Recent Coronary Angiogram Results:    Hari Montgomery  Coronary Angiogram  Order# [id number]  Reading physician: Jesus Sepulveda MD Ordering physician: Jesus Sepulveda MD Study date: 03/19/2018       Patient Information    Name MRN Description   Hari Montgomery [id number] 57 year old male         Conclusion      Estimated blood loss was <20 ml.    The LM vessel was injected.    The LAD vessel was injected .    The ramus vessel was injected.    The left circumflex was injected.    The RCA was injected.    Dist RCA lesion 100% stenosed.     Hari Montgomery is a 57 y.o. old male with CAD s/p ACS and CABG x4 w/ L-LAD, V-D, V-OM, ?WILL-RCA and RCA endarterectomy, obesity, family h/o CAD who is here for inferior STEMI.      Findings:  LM:no obstruction  LAD:occluded in mid-vessel, fills distally via patent L-LAD, w/ mild diffuse disease in distal vessel.   RC: severe diffuse disease in proximal portion, small caliber, similar to prior angio  Lcx:occluded in mid-vessel. Fills distally via patent V-OM  RCA:dominant, occluded in distal vessel     Grafts:  L-LAD: patent  SVG-OM: patent  SVG-D: patent, ectatic distally  WILL-RCA: not visualized on selective, non-selective in-situ, and aortogram injections     Access:  R Femoral artery     PCI:  RCA was engaged w/ a 6F JR4 Guide catheter and the lesion in dRCA was difficult to wire w/ a Satinder, so it was instead wired w/ a Turnpike-supported  Whisper J wire confirming luminal location via careful injection through the microcatheter. W/ the use of   a trapper balloon the wire was exchanged for a Forte and dRCA was ballooned w/ a 2.0x12mm Emerge balloon and stented w/ a 2.59n49by Synergy EES at 12 tk. Final angiography showed no dissection or perforation and a CHIKIS 3 flow.     Closure:   Perclose     Impression/plan:  Pt. is a 57 y.o. old male with CAD s/p ACS and CABG x4 w/ L-LAD, V-D, V-OM, ?WILL-RCA and RCA endarterectomy, obesity, family h/o CAD who is here for inferior STEMI.  - multi-vessel CAD, patent L-LAD, V-D, V-OM; dRCA s/p endarterectomy and WILL-RCA. Unable to visualize R-RCA, but native RCA is more likely to be the true JEROME. Suspect that WILL may not have matured due to successful endarterectomy  - in either case, now s/p successful PCI to native RCA w/ EESx1 w/ resolution of ST elevations and CP  - ASA 81mg daily indefinitely, ticagrelor 180mg once, followed by 90mg twice daily for at least 12 months  - TTE in am, atorva 80. Hold off on BB/ACEI/nitrates tonight but plan to at least start BB this admission  - continue aggressive risk factor modification           Physical Examination  Review of Systems   Vitals: 226 pounds, blood pressure 118/64, heart rate of 68 and regular  Wt Readings from Last 3 Encounters:   03/26/21 110.4 kg (243 lb 6.4 oz)   03/16/20 110.7 kg (244 lb)   01/11/19 116.6 kg (257 lb)       General Appearance:   no distress, normal body habitus   ENT/Mouth: membranes moist, no oral lesions or bleeding gums.      EYES:  no scleral icterus, normal conjunctivae   Neck: no carotid bruits or thyromegaly   Chest/Lungs:   lungs are clear to auscultation, no rales or wheezing, well-healed sternal scar, equal chest wall expansion    Cardiovascular:   Regular. Normal first and second heart sounds with no murmurs, rubs, S4 noted; the carotid, radial and posterior tibial pulses are intact, Jugular venous pressure within normal limits,  no significant edema bilaterally    Abdomen:  no organomegaly, masses, bruits, or tenderness; bowel sounds are present   Extremities: no cyanosis or clubbing   Skin: no xanthelasma, warm.    Neurologic: normal  bilateral, no tremors     Psychiatric: alert and oriented x3, calm        Please refer above for cardiac ROS details.        Medical History  Surgical History Family History Social History   Past Medical History:   Diagnosis Date     Myocardial infarction (H) 2009     Past Surgical History:   Procedure Laterality Date     CV CORONARY ANGIOGRAM N/A 3/19/2018    Procedure: Coronary Angiogram;  Surgeon: Jesus Sepulveda MD;  Location: [facility];  Service:      No family history on file.     Social History     Socioeconomic History     Marital status:      Spouse name: Not on file     Number of children: Not on file     Years of education: Not on file     Highest education level: Not on file   Occupational History     Not on file   Tobacco Use     Smoking status: Never Smoker     Smokeless tobacco: Never Used   Substance and Sexual Activity     Alcohol use: No     Drug use: No     Sexual activity: Not on file   Other Topics Concern     Not on file   Social History Narrative     Not on file     Social Determinants of Health     Financial Resource Strain: Not on file   Food Insecurity: Not on file   Transportation Needs: Not on file   Physical Activity: Not on file   Stress: Not on file   Social Connections: Not on file   Intimate Partner Violence: Not on file   Housing Stability: Not on file           Medications  Allergies   Current Outpatient Medications   Medication Sig Dispense Refill     acetaminophen (TYLENOL) 325 MG tablet [ACETAMINOPHEN (TYLENOL) 325 MG TABLET] Take 650 mg by mouth at bedtime.       aspirin 325 MG tablet [ASPIRIN 325 MG TABLET] Take 325 mg by mouth daily.       CONTOUR NEXT METER Misc [CONTOUR NEXT METER MISC] Use As Directed.       CONTOUR NEXT TEST STRIPS strips  [CONTOUR NEXT TEST STRIPS STRIPS] USE AS DIRECTED 3 TIMES A DAY       coQ10, ubiquinol, 200 mg cap [COQ10, UBIQUINOL, 200 MG CAP] Take 600 mg by mouth daily.       cyclobenzaprine (FLEXERIL) 10 MG tablet [CYCLOBENZAPRINE (FLEXERIL) 10 MG TABLET] Take 10 mg by mouth 3 (three) times a day as needed.  0     ezetimibe (ZETIA) 10 MG tablet Take 1 tablet (10 mg) daily. 90 tablet 0     lisinopriL (PRINIVIL,ZESTRIL) 5 MG tablet [LISINOPRIL (PRINIVIL,ZESTRIL) 5 MG TABLET] Take 1 tablet (5 mg total) by mouth daily. 90 tablet 1     metFORMIN (GLUCOPHAGE-XR) 500 MG 24 hr tablet [METFORMIN (GLUCOPHAGE-XR) 500 MG 24 HR TABLET] Take 1,000 mg by mouth 2 (two) times a day.       metoprolol succinate (TOPROL-XL) 50 MG 24 hr tablet [METOPROLOL SUCCINATE (TOPROL-XL) 50 MG 24 HR TABLET] Take 1 tablet (50 mg total) by mouth daily. 90 tablet 1     MICROLET LANCET [MICROLET LANCET]        nitroglycerin (NITROSTAT) 0.3 MG SL tablet [NITROGLYCERIN (NITROSTAT) 0.3 MG SL TABLET] Place 1 tablet (0.3 mg total) under the tongue every 5 (five) minutes as needed for chest pain. 1 Bottle 0     pravastatin (PRAVACHOL) 80 MG tablet [PRAVASTATIN (PRAVACHOL) 80 MG TABLET] TAKE 1 TABLET BY MOUTH EVERYDAY AT BEDTIME 90 tablet 0     No Known Allergies       Lab Results    Chemistry/lipid CBC Cardiac Enzymes/BNP/TSH/INR   Recent Labs   Lab Test 04/14/21  0845   CHOL 149   HDL 47   LDL 69   TRIG 163*     Recent Labs   Lab Test 04/14/21  0845 11/30/20  1008 03/16/20  0921   LDL 69 90 71     Recent Labs   Lab Test 06/04/21  0836      POTASSIUM 4.4   CHLORIDE 105   CO2 21*   *   BUN 19   CR 0.71   GFRESTIMATED >60   CHARLENE 9.9     Recent Labs   Lab Test 06/04/21  0836 11/30/20  1008 03/16/20  0921   CR 0.71 0.72 0.80     Recent Labs   Lab Test 03/16/20  0921   A1C 13.0*          Recent Labs   Lab Test 03/21/18  0514 03/20/18  0442 03/19/18 2050 03/19/18  1814   WBC  --   --   --  6.7   HGB  --  13.5*   < > 15.5   HCT  --   --   --  46.1   MCV  --    --   --  92     --   --  224    < > = values in this interval not displayed.     Recent Labs   Lab Test 03/20/18  0442 03/19/18  2050 03/19/18 1814   HGB 13.5* 13.6* 15.5    Recent Labs   Lab Test 03/19/18 1814   TROPONINI <0.01     No results for input(s): BNP, NTBNPI, NTBNP in the last 56370 hours.  No results for input(s): TSH in the last 84469 hours.  Recent Labs   Lab Test 03/19/18 1814   INR 0.99        Soren Joseph MD              Thank you for allowing me to participate in the care of your patient.      Sincerely,     Soren Joseph MD     [facility]  cc:   Soren Joseph MD  [address]  [address]  [place] [address]

## 2022-01-17 NOTE — PROGRESS NOTES
HEART CARE ENCOUNTER CONSULTATON NOTE      [facility]  [phone]      Assessment/Recommendations   Assessment/Plan:  1.  Coronary artery disease with prior bypass surgery and acute myocardial infarction March 2018 with intervention to the native right coronary artery as outlined below.  Clinically doing well.  He has done a nice job with respect to modifying his risks and and working on diet and more regular exercise.  We discussed follow-up stress testing and discussed the stress test from April 2021.  At this point he wanted to hold on additional stress testing.  I did suggest the possibility of cutting back on the metoprolol given the blunted heart rate response to exercise which is asymptomatic.  At this point his preference was to continue with the current combination of medications as he is asymptomatic.    2.  Hypertension.  Blood pressure appears to be under good control 2.  I have reviewed recent blood pressure notes from his primary care provider as well as blood pressure today.    3.  Dyslipidemia.  He has been intolerant to Crestor and atorvastatin but is tolerant of pravastatin and Zetia.  As noted above lipids and hemoglobin A1c from April 2021 are reviewed.  We are going to plan follow-up laboratory studies in 1 month as he did run out of Zetia for a few days.  I renewed the Zetia and asked him to avoid running out of medications and I have given him my nurses telephone number.    1.  Lipid, comprehensive metabolic profile and hemoglobin A1c in 1 month.  2.  Follow-up in 6 months or sooner if specific issues or questions arise.       History of Present Illness/Subjective    HPI: Hari Montgomery is a 61 year old male who is seen in follow up.He has a history of coronary artery bypass grafting with acute myocardial infarction March 2018.  Coronary angiogram demonstrated occlusion of the LAD in its mid vessel, occlusion of the distal right coronary artery, occlusion of the  circumflex, patent graft to the LAD, patent graft to the obtuse marginal, patent vein graft to a diagonal that was ectatic.  The right internal mammary artery to the right coronary artery was not well visualized with successful stenting to the native right coronary artery.     Stress echocardiogram most recently from April 2021 was nondiagnostic secondary to significant blunting of his heart rate with exercise.  He took his metoprolol that morning per his recall.  Left-ventricular function was said to be normal without significant valve abnormality without signs of ischemia at the heart rate achieved.    He reports today that he has been feeling well from a cardiovascular standpoint.  He has had no anginal chest discomfort or use of nitroglycerin.  He has lost approximately 20 pounds and is feeling well.  He does continue to try to be careful with respect to heavy lifting especially in the cold weather.  I have reviewed most recent lipids that are from  April 2021 where total cholesterol was 149 down from 173 and LDL 69 down from 90.  This triglycerides are 163 and a hemoglobin A1c through his primary care physician's office was reported to be 6.4.  He indicates that he ran out of Zetia within the past few days and I encouraged him not to run out of medications and to contact us if he is in need of refills.  No symptoms of claudication.  Recent Echocardiogram Results:  ding Physician Reading Date Result Priority   Walker Gayle MD  [phone] 4/8/2021 Routine   Provider, Historical 4/8/2021      Narrative & Impression    An exercise echocardiogram stress test was performed following a Toni protocol with the patient exercising for 7 minutes and 5 seconds    The patient's exercise tolerance was normal.    Heart rate demonstrated a blunted response to exercise, only achieving 61% of age-predicted maximum heart rate.    The stress electrocardiogram an echocardiogram are non-diagnostic due to failure to achieve  target heart rate. No signs of ischemia noted at heart rate achieved.    Left ventricle ejection fraction is normal. The estimated left ventricular ejection fraction is 60%.    No significant valve disease identified on screening echo images.    When compared to the previous study dated 9/21/2018, no significant change.         Recent Coronary Angiogram Results:    Hari Montgomery  Coronary Angiogram  Order# [id number]  Reading physician: Jesus Sepulveda MD Ordering physician: Jesus Sepulveda MD Study date: 03/19/2018       Patient Information    Name MRN Description   Hari Montgomery [id number] 57 year old male         Conclusion      Estimated blood loss was <20 ml.    The LM vessel was injected.    The LAD vessel was injected .    The ramus vessel was injected.    The left circumflex was injected.    The RCA was injected.    Dist RCA lesion 100% stenosed.     Hari Montgomery is a 57 y.o. old male with CAD s/p ACS and CABG x4 w/ L-LAD, V-D, V-OM, ?WILL-RCA and RCA endarterectomy, obesity, family h/o CAD who is here for inferior STEMI.      Findings:  LM:no obstruction  LAD:occluded in mid-vessel, fills distally via patent L-LAD, w/ mild diffuse disease in distal vessel.   RC: severe diffuse disease in proximal portion, small caliber, similar to prior angio  Lcx:occluded in mid-vessel. Fills distally via patent V-OM  RCA:dominant, occluded in distal vessel     Grafts:  L-LAD: patent  SVG-OM: patent  SVG-D: patent, ectatic distally  WILL-RCA: not visualized on selective, non-selective in-situ, and aortogram injections     Access:  R Femoral artery     PCI:  RCA was engaged w/ a 6F JR4 Guide catheter and the lesion in dRCA was difficult to wire w/ a Forte, so it was instead wired w/ a Turnpike-supported Whisper J wire confirming luminal location via careful injection through the microcatheter. W/ the use of   a trapper balloon the wire was exchanged for a Forte and dRCA was ballooned w/ a 2.0x12mm Emerge balloon and  stented w/ a [id number] Synergy EES at 12 tk. Final angiography showed no dissection or perforation and a CHIKIS 3 flow.     Closure:   Perclose     Impression/plan:  Pt. is a 57 y.o. old male with CAD s/p ACS and CABG x4 w/ L-LAD, V-D, V-OM, ?WILL-RCA and RCA endarterectomy, obesity, family h/o CAD who is here for inferior STEMI.  - multi-vessel CAD, patent L-LAD, V-D, V-OM; dRCA s/p endarterectomy and WILL-RCA. Unable to visualize R-RCA, but native RCA is more likely to be the true JEROME. Suspect that WILL may not have matured due to successful endarterectomy  - in either case, now s/p successful PCI to native RCA w/ EESx1 w/ resolution of ST elevations and CP  - ASA 81mg daily indefinitely, ticagrelor 180mg once, followed by 90mg twice daily for at least 12 months  - TTE in am, atorva 80. Hold off on BB/ACEI/nitrates tonight but plan to at least start BB this admission  - continue aggressive risk factor modification           Physical Examination  Review of Systems   Vitals: 226 pounds, blood pressure 118/64, heart rate of 68 and regular  Wt Readings from Last 3 Encounters:   03/26/21 110.4 kg (243 lb 6.4 oz)   03/16/20 110.7 kg (244 lb)   01/11/19 116.6 kg (257 lb)       General Appearance:   no distress, normal body habitus   ENT/Mouth: membranes moist, no oral lesions or bleeding gums.      EYES:  no scleral icterus, normal conjunctivae   Neck: no carotid bruits or thyromegaly   Chest/Lungs:   lungs are clear to auscultation, no rales or wheezing, well-healed sternal scar, equal chest wall expansion    Cardiovascular:   Regular. Normal first and second heart sounds with no murmurs, rubs, S4 noted; the carotid, radial and posterior tibial pulses are intact, Jugular venous pressure within normal limits, no significant edema bilaterally    Abdomen:  no organomegaly, masses, bruits, or tenderness; bowel sounds are present   Extremities: no cyanosis or clubbing   Skin: no xanthelasma, warm.    Neurologic: normal   bilateral, no tremors     Psychiatric: alert and oriented x3, calm        Please refer above for cardiac ROS details.        Medical History  Surgical History Family History Social History   Past Medical History:   Diagnosis Date     Myocardial infarction (H) 2009     Past Surgical History:   Procedure Laterality Date     CV CORONARY ANGIOGRAM N/A 3/19/2018    Procedure: Coronary Angiogram;  Surgeon: Jesus Sepulveda MD;  Location: [facility];  Service:      No family history on file.     Social History     Socioeconomic History     Marital status:      Spouse name: Not on file     Number of children: Not on file     Years of education: Not on file     Highest education level: Not on file   Occupational History     Not on file   Tobacco Use     Smoking status: Never Smoker     Smokeless tobacco: Never Used   Substance and Sexual Activity     Alcohol use: No     Drug use: No     Sexual activity: Not on file   Other Topics Concern     Not on file   Social History Narrative     Not on file     Social Determinants of Health     Financial Resource Strain: Not on file   Food Insecurity: Not on file   Transportation Needs: Not on file   Physical Activity: Not on file   Stress: Not on file   Social Connections: Not on file   Intimate Partner Violence: Not on file   Housing Stability: Not on file           Medications  Allergies   Current Outpatient Medications   Medication Sig Dispense Refill     acetaminophen (TYLENOL) 325 MG tablet [ACETAMINOPHEN (TYLENOL) 325 MG TABLET] Take 650 mg by mouth at bedtime.       aspirin 325 MG tablet [ASPIRIN 325 MG TABLET] Take 325 mg by mouth daily.       CONTOUR NEXT METER Misc [CONTOUR NEXT METER MISC] Use As Directed.       CONTOUR NEXT TEST STRIPS strips [CONTOUR NEXT TEST STRIPS STRIPS] USE AS DIRECTED 3 TIMES A DAY       coQ10, ubiquinol, 200 mg cap [COQ10, UBIQUINOL, 200 MG CAP] Take 600 mg by mouth daily.       cyclobenzaprine (FLEXERIL) 10 MG tablet  [CYCLOBENZAPRINE (FLEXERIL) 10 MG TABLET] Take 10 mg by mouth 3 (three) times a day as needed.  0     ezetimibe (ZETIA) 10 MG tablet Take 1 tablet (10 mg) daily. 90 tablet 0     lisinopriL (PRINIVIL,ZESTRIL) 5 MG tablet [LISINOPRIL (PRINIVIL,ZESTRIL) 5 MG TABLET] Take 1 tablet (5 mg total) by mouth daily. 90 tablet 1     metFORMIN (GLUCOPHAGE-XR) 500 MG 24 hr tablet [METFORMIN (GLUCOPHAGE-XR) 500 MG 24 HR TABLET] Take 1,000 mg by mouth 2 (two) times a day.       metoprolol succinate (TOPROL-XL) 50 MG 24 hr tablet [METOPROLOL SUCCINATE (TOPROL-XL) 50 MG 24 HR TABLET] Take 1 tablet (50 mg total) by mouth daily. 90 tablet 1     MICROLET LANCET [MICROLET LANCET]        nitroglycerin (NITROSTAT) 0.3 MG SL tablet [NITROGLYCERIN (NITROSTAT) 0.3 MG SL TABLET] Place 1 tablet (0.3 mg total) under the tongue every 5 (five) minutes as needed for chest pain. 1 Bottle 0     pravastatin (PRAVACHOL) 80 MG tablet [PRAVASTATIN (PRAVACHOL) 80 MG TABLET] TAKE 1 TABLET BY MOUTH EVERYDAY AT BEDTIME 90 tablet 0     No Known Allergies       Lab Results    Chemistry/lipid CBC Cardiac Enzymes/BNP/TSH/INR   Recent Labs   Lab Test 04/14/21  0845   CHOL 149   HDL 47   LDL 69   TRIG 163*     Recent Labs   Lab Test 04/14/21  0845 11/30/20  1008 03/16/20  0921   LDL 69 90 71     Recent Labs   Lab Test 06/04/21  0836      POTASSIUM 4.4   CHLORIDE 105   CO2 21*   *   BUN 19   CR 0.71   GFRESTIMATED >60   CHARLENE 9.9     Recent Labs   Lab Test 06/04/21  0836 11/30/20  1008 03/16/20  0921   CR 0.71 0.72 0.80     Recent Labs   Lab Test 03/16/20  0921   A1C 13.0*          Recent Labs   Lab Test 03/21/18  0514 03/20/18  0442 03/19/18  2050 03/19/18  1814   WBC  --   --   --  6.7   HGB  --  13.5*   < > 15.5   HCT  --   --   --  46.1   MCV  --   --   --  92     --   --  224    < > = values in this interval not displayed.     Recent Labs   Lab Test 03/20/18  0442 03/19/18 2050 03/19/18  1814   HGB 13.5* 13.6* 15.5    Recent Labs   Lab Test  03/19/18 1814   TROPONINI <0.01     No results for input(s): BNP, NTBNPI, NTBNP in the last 85198 hours.  No results for input(s): TSH in the last 14914 hours.  Recent Labs   Lab Test 03/19/18 1814   INR 0.99        Soren Joseph MD

## 2022-01-27 NOTE — PATIENT INSTRUCTIONS - HE
Nice to visit with you today.We are going to plan a stress echocardiogram and fasting cholesterol numbers and then will be in touch with results.My nurse is Luma and her number is [phone] call with any heart related questions.   No

## 2022-02-17 ENCOUNTER — LAB (OUTPATIENT)
Dept: CARDIOLOGY | Facility: CLINIC | Age: 62
End: 2022-02-17
Payer: COMMERCIAL

## 2022-02-17 DIAGNOSIS — E78.5 DYSLIPIDEMIA, GOAL LDL BELOW 70: ICD-10-CM

## 2022-02-17 DIAGNOSIS — I25.709 CORONARY ARTERY DISEASE INVOLVING CORONARY BYPASS GRAFT OF NATIVE HEART WITH ANGINA PECTORIS (H): ICD-10-CM

## 2022-02-17 LAB
ALBUMIN SERPL-MCNC: 4.2 G/DL (ref 3.5–5)
ALP SERPL-CCNC: 59 U/L (ref 45–120)
ALT SERPL W P-5'-P-CCNC: 20 U/L (ref 0–45)
ANION GAP SERPL CALCULATED.3IONS-SCNC: 9 MMOL/L (ref 5–18)
AST SERPL W P-5'-P-CCNC: 19 U/L (ref 0–40)
BILIRUB SERPL-MCNC: 0.8 MG/DL (ref 0–1)
BUN SERPL-MCNC: 14 MG/DL (ref 8–22)
CALCIUM SERPL-MCNC: 8.6 MG/DL (ref 8.5–10.5)
CHLORIDE BLD-SCNC: 105 MMOL/L (ref 98–107)
CHOLEST SERPL-MCNC: 137 MG/DL
CO2 SERPL-SCNC: 24 MMOL/L (ref 22–31)
CREAT SERPL-MCNC: 0.66 MG/DL (ref 0.7–1.3)
FASTING STATUS PATIENT QL REPORTED: YES
GFR SERPL CREATININE-BSD FRML MDRD: >90 ML/MIN/1.73M2
GLUCOSE BLD-MCNC: 111 MG/DL (ref 70–125)
HBA1C MFR BLD: 6.5 %
HDLC SERPL-MCNC: 42 MG/DL
LDLC SERPL CALC-MCNC: 66 MG/DL
POTASSIUM BLD-SCNC: 4.6 MMOL/L (ref 3.5–5)
PROT SERPL-MCNC: 6.9 G/DL (ref 6–8)
SODIUM SERPL-SCNC: 138 MMOL/L (ref 136–145)
TRIGL SERPL-MCNC: 146 MG/DL

## 2022-02-17 PROCEDURE — 80053 COMPREHEN METABOLIC PANEL: CPT

## 2022-02-17 PROCEDURE — 80061 LIPID PANEL: CPT

## 2022-02-17 PROCEDURE — 36415 COLL VENOUS BLD VENIPUNCTURE: CPT

## 2022-02-17 PROCEDURE — 83036 HEMOGLOBIN GLYCOSYLATED A1C: CPT

## 2022-06-14 ENCOUNTER — TELEPHONE (OUTPATIENT)
Dept: CARDIOLOGY | Facility: CLINIC | Age: 62
End: 2022-06-14
Payer: COMMERCIAL

## 2022-06-14 DIAGNOSIS — I25.709 CORONARY ARTERY DISEASE INVOLVING CORONARY BYPASS GRAFT OF NATIVE HEART WITH ANGINA PECTORIS (H): ICD-10-CM

## 2022-06-14 DIAGNOSIS — E78.5 DYSLIPIDEMIA, GOAL LDL BELOW 70: ICD-10-CM

## 2022-06-14 DIAGNOSIS — I21.19 ACUTE MI, INFERIOR WALL, INITIAL EPISODE OF CARE (H): ICD-10-CM

## 2022-06-14 RX ORDER — LISINOPRIL 5 MG/1
5 TABLET ORAL DAILY
Qty: 90 TABLET | Refills: 0 | Status: SHIPPED | OUTPATIENT
Start: 2022-06-14 | End: 2023-08-03

## 2022-06-14 RX ORDER — EZETIMIBE 10 MG/1
TABLET ORAL
Qty: 90 TABLET | Refills: 0 | Status: SHIPPED | OUTPATIENT
Start: 2022-06-14

## 2022-06-14 RX ORDER — PRAVASTATIN SODIUM 80 MG/1
80 TABLET ORAL AT BEDTIME
Qty: 90 TABLET | Refills: 0 | Status: SHIPPED | OUTPATIENT
Start: 2022-06-14

## 2022-06-14 RX ORDER — METOPROLOL SUCCINATE 50 MG/1
50 TABLET, EXTENDED RELEASE ORAL DAILY
Qty: 90 TABLET | Refills: 0 | Status: SHIPPED | OUTPATIENT
Start: 2022-06-14 | End: 2023-08-03

## 2022-06-14 NOTE — TELEPHONE ENCOUNTER
[facility] Call Center    Phone Message    May a detailed message be left on voicemail: yes     Reason for Call: Appointment Intake    Referring Provider Name: Dr Joseph  Diagnosis and/or Symptoms: Dyslipidemia, goal LDL below 70    Hari called because he needs to schedule a follow-up with Dr Joseph. He stated they he needs lab work also for his blood pressure and meds. There are no orders yet, but the pt is sure he needs labs so I scheduled him for 06.23.22. Please place necessary lab orders. Dr Joseph's next available isn't until Sept. and Hari didn't want to schedule out that far until the lab results are back. He is hoping that if Dr Joseph says his labs are fine he can just speak to him over the phone instead of scheduling a visit. If an in-clinic follow-up in necessary please let Hari know to get that scheduled.    Action Taken: Other: Cardiology    Travel Screening: Not Applicable

## 2022-06-14 NOTE — TELEPHONE ENCOUNTER
[facility] Call Center    Phone Message    May a detailed message be left on voicemail: yes     Reason for Call: Medication Refill Request    Has the patient contacted the pharmacy for the refill? Yes   Name of medication being requested: metoprolol succinate (TOPROL-XL) 50 MG 24 hr tablet  lisinopriL (PRINIVIL,ZESTRIL) 5 MG tablet  ezetimibe (ZETIA) 10 MG tablet  pravastatin (PRAVACHOL) 80 MG tablet  Provider who prescribed the medication: Dr Joseph  Pharmacy: [facility] #[id number] [address]  Date medication is needed:     Hari will be needing refills for this prescriptions soon. He isn't able to schedule a follow-up with Dr Joseph until Sept. He didn't schedule a follow-up yet as he wants Dr Joseph to first review his lab work that he has scheduled for 06.23.22. If he will need a follow-up sooner for the medications please call Hari with dates and times that Dr Joseph could see him.    Action Taken: Other: Cardiology    Travel Screening: Not Applicable

## 2022-06-15 NOTE — TELEPHONE ENCOUNTER
Happy to do a video visit.  Hemoglobin A1c I would defer to his primary care provider and laboratory studies from February are at goal from coronary angiogram standpoint so therefore I do not think we need any cardiac laboratories based on the studies from February.    Soren Joseph MD

## 2022-07-19 ENCOUNTER — TRANSFERRED RECORDS (OUTPATIENT)
Dept: HEALTH INFORMATION MANAGEMENT | Facility: CLINIC | Age: 62
End: 2022-07-19

## 2022-07-19 ENCOUNTER — LAB REQUISITION (OUTPATIENT)
Dept: LAB | Facility: CLINIC | Age: 62
End: 2022-07-19

## 2022-07-19 DIAGNOSIS — E11.65 TYPE 2 DIABETES MELLITUS WITH HYPERGLYCEMIA (H): ICD-10-CM

## 2022-07-19 DIAGNOSIS — Z12.5 ENCOUNTER FOR SCREENING FOR MALIGNANT NEOPLASM OF PROSTATE: ICD-10-CM

## 2022-07-19 LAB
ANION GAP SERPL CALCULATED.3IONS-SCNC: 10 MMOL/L (ref 7–15)
BUN SERPL-MCNC: 16.1 MG/DL (ref 8–23)
CALCIUM SERPL-MCNC: 9.1 MG/DL (ref 8.8–10.2)
CHLORIDE SERPL-SCNC: 104 MMOL/L (ref 98–107)
CHOLEST SERPL-MCNC: 139 MG/DL
CREAT SERPL-MCNC: 0.6 MG/DL (ref 0.67–1.17)
DEPRECATED HCO3 PLAS-SCNC: 25 MMOL/L (ref 22–29)
GFR SERPL CREATININE-BSD FRML MDRD: >90 ML/MIN/1.73M2
GLUCOSE SERPL-MCNC: 132 MG/DL (ref 70–99)
HDLC SERPL-MCNC: 40 MG/DL
LDLC SERPL CALC-MCNC: 57 MG/DL
NONHDLC SERPL-MCNC: 99 MG/DL
POTASSIUM SERPL-SCNC: 4.5 MMOL/L (ref 3.4–5.3)
SODIUM SERPL-SCNC: 139 MMOL/L (ref 136–145)
TRIGL SERPL-MCNC: 212 MG/DL

## 2022-07-19 PROCEDURE — 80061 LIPID PANEL: CPT | Performed by: FAMILY MEDICINE

## 2022-07-19 PROCEDURE — 80048 BASIC METABOLIC PNL TOTAL CA: CPT | Performed by: FAMILY MEDICINE

## 2022-07-19 PROCEDURE — G0103 PSA SCREENING: HCPCS | Performed by: FAMILY MEDICINE

## 2022-07-20 LAB — PSA SERPL-MCNC: 0.87 NG/ML (ref 0–4.5)

## 2022-09-01 ENCOUNTER — OFFICE VISIT (OUTPATIENT)
Dept: CARDIOLOGY | Facility: CLINIC | Age: 62
End: 2022-09-01
Attending: INTERNAL MEDICINE
Payer: COMMERCIAL

## 2022-09-01 VITALS
HEIGHT: 68 IN | SYSTOLIC BLOOD PRESSURE: 112 MMHG | BODY MASS INDEX: 36.15 KG/M2 | HEART RATE: 80 BPM | DIASTOLIC BLOOD PRESSURE: 72 MMHG | RESPIRATION RATE: 16 BRPM | WEIGHT: 238.5 LBS

## 2022-09-01 DIAGNOSIS — E78.5 DYSLIPIDEMIA, GOAL LDL BELOW 70: ICD-10-CM

## 2022-09-01 DIAGNOSIS — I25.709 CORONARY ARTERY DISEASE INVOLVING CORONARY BYPASS GRAFT OF NATIVE HEART WITH ANGINA PECTORIS (H): ICD-10-CM

## 2022-09-01 PROCEDURE — 99214 OFFICE O/P EST MOD 30 MIN: CPT | Performed by: INTERNAL MEDICINE

## 2022-09-01 RX ORDER — SILDENAFIL 100 MG/1
TABLET, FILM COATED ORAL
COMMUNITY
Start: 2022-07-19

## 2022-09-01 NOTE — LETTER
9/1/2022    Shane Mansfield MD  [facility] [address]  [facility] [address]    RE: Hari Montgomery       Dear Colleague,     I had the pleasure of seeing Hari Montgomery in the [facility] [place] [facility].    HEART CARE ENCOUNTER CONSULTATON NOTE      [facility]  [phone]      Assessment/Recommendations   Assessment/Plan:  1.  Coronary artery disease with prior bypass surgery and acute myocardial infarction in March 2018 with intervention to the native right coronary artery as outlined below.  Symptom presentation was primarily heartburn sensation.  He has had no symptoms that are reminiscent of that presentation.  He does have a job that requires him to be active and I have recommended that we pursue follow-up stress testing.  Stress echocardiogram April 2021 was negative for ischemia at a significantly blunted heart rate.  He was able to exercise 7 minutes and 5 seconds.    2.  Hypertension.  Blood pressure appears to be under good control based on blood pressure readings today.  He does follow-up closely with his primary care provider as well.  I do not believe I have the most recent notes from his primary care provider there is a note from August 2021 at which time blood pressure was  as 118/80     3.  Dyslipidemia.  Most recent lipids are reviewed from July 2022.  This was completed at the request of his primary care provider.  His total cholesterol 139, triglycerides 212, HDL of 40 with an LDL of 57.  I did review the mild elevation in triglycerides and the importance of diet and ambulation and ongoing blood sugar control    Plan exercise stress echocardiogram within the next few weeks  Follow-up 6 months             History of Present Illness/Subjective    HPI: Hari Montgomery is a 61 year old male who is seeen in follow up.He has a history of coronary artery bypass grafting with acute myocardial infarction March 2018.  Coronary angiogram demonstrated occlusion of the LAD in  its mid vessel, occlusion of the distal right coronary artery, occlusion of the circumflex, patent graft to the LAD, patent graft to the obtuse marginal, patent vein graft to a diagonal that was ectatic.  The right internal mammary artery to the right coronary artery was not well visualized with successful stenting to the native right coronary artery.     Stress echocardiogram most recently from April 2021 was nondiagnostic secondary to significant blunting of his heart rate with exercise.  He took his metoprolol that morning per his recall.  Left-ventricular function was said to be normal without significant valve abnormality without signs of ischemia at the heart rate achieved.    He reports feeling well.  He specifically denies chest pain, shortness of breath, exercise intolerance or dizziness.  His job does require him to be quite active.  I have reviewed recent notes and lab data from his primary care provider.    Recent Echocardiogram Results:  Echocardiogram Exercise Stress  Order: [id number]   Status: Final result     Visible to patient: No (inaccessible in MyChart)     Next appt: 09/01/2022 at 08:50 AM in Cardiology (Soren Joseph MD)     Dx: CAD (coronary artery disease)     1 Result Note    Details    Reading Physician Reading Date Result Priority   Walker Gayle MD  [phone] 4/8/2021 Routine   Provider, Historical 4/8/2021      Narrative & Impression    An exercise echocardiogram stress test was performed following a Toni protocol with the patient exercising for 7 minutes and 5 seconds    The patient's exercise tolerance was normal.    Heart rate demonstrated a blunted response to exercise, only achieving 61% of age-predicted maximum heart rate.    The stress electrocardiogram an echocardiogram are non-diagnostic due to failure to achieve target heart rate. No signs of ischemia noted at heart rate achieved.    Left ventricle ejection fraction is normal. The estimated left ventricular ejection  fraction is 60%.    No significant valve disease identified on screening echo images.    When compared to the previous study dated 9/21/2018, no significant change.            Recent Coronary Angiogram Results:  Hari Montgomery  Coronary Angiogram  Order# [id number]  Reading physician: Jesus Sepulveda MD Ordering physician: Jesus Sepulveda MD Study date: 03/19/2018       Patient Information    Name MRN Description   Hari Montgomery [id number] 57 year old male                     Conclusion      Estimated blood loss was <20 ml.    The LM vessel was injected.    The LAD vessel was injected .    The ramus vessel was injected.    The left circumflex was injected.    The RCA was injected.    Dist RCA lesion 100% stenosed.     Hari Montgomery is a 57 y.o. old male with CAD s/p ACS and CABG x4 w/ L-LAD, V-D, V-OM, ?WILL-RCA and RCA endarterectomy, obesity, family h/o CAD who is here for inferior STEMI.      Findings:  LM:no obstruction  LAD:occluded in mid-vessel, fills distally via patent L-LAD, w/ mild diffuse disease in distal vessel.   RC: severe diffuse disease in proximal portion, small caliber, similar to prior angio  Lcx:occluded in mid-vessel. Fills distally via patent V-OM  RCA:dominant, occluded in distal vessel     Grafts:  L-LAD: patent  SVG-OM: patent  SVG-D: patent, ectatic distally  WILL-RCA: not visualized on selective, non-selective in-situ, and aortogram injections     Access:  R Femoral artery     PCI:  RCA was engaged w/ a 6F JR4 Guide catheter and the lesion in dRCA was difficult to wire w/ a Forte, so it was instead wired w/ a Turnpike-supported Whisper J wire confirming luminal location via careful injection through the microcatheter. W/ the use of   a trapper balloon the wire was exchanged for a Forte and dRCA was ballooned w/ a 2.0x12mm Emerge balloon and stented w/ a [id number] Synergy EES at 12 tk. Final angiography showed no dissection or perforation and a CHIKIS 3 flow.     Closure:    Perclo     Impression/plan:  Pt. is a 57 y.o. old male with CAD s/p ACS and CABG x4 w/ L-LAD, V-D, V-OM, ?WILL-RCA and RCA endarterectomy, obesity, family h/o CAD who is here for inferior STEMI.  - multi-vessel CAD, patent L-LAD, V-D, V-OM; dRCA s/p endarterectomy and WILL-RCA. Unable to visualize R-RCA, but native RCA is more likely to be the true JEROME. Suspect that WILL may not have matured due to successful endarterectomy  - in either case, now s/p successful PCI to native RCA w/ EESx1 w/ resolution of ST elevations and CP  - ASA 81mg daily indefinitely, ticagrelor 180mg once, followed by 90mg twice daily for at least 12 months  - TTE in am, atorva 80. Hold off on BB/ACEI/nitrates tonight but plan to at least start BB this admission  - continue aggressive risk factor modification            Physical Examination  Review of Systems   Vitals: 112/72, weight 238 pounds, heart rate of 80 and regular  Wt Readings from Last 3 Encounters:   01/17/22 102.5 kg (226 lb)   03/26/21 110.4 kg (243 lb 6.4 oz)   03/16/20 110.7 kg (244 lb)       General Appearance:   no distress, normal body habitus   ENT/Mouth:  Wearing a facemask      EYES:  no scleral icterus, normal conjunctivae   Neck: no carotid bruits or thyromegaly   Chest/Lungs:   lungs are clear to auscultation, no rales or wheezing, well-healed sternal scar, equal chest wall expansion    Cardiovascular:   Regular. Normal first and second heart sounds with no murmurs, rubs, or gallops; the carotid, radial and posterior tibial pulses are intact, Jugular venous pressure within normal limits, no edema bilaterally    Abdomen:  no  bruits, or tenderness; bowel sounds are present   Extremities: no cyanosis or clubbing   Skin: no xanthelasma, warm.    Neurologic:  no tremors     Psychiatric: alert and oriented x3, calm        Please refer above for cardiac ROS details.        Medical History  Surgical History Family History Social History   Past Medical History:   Diagnosis  Date     Myocardial infarction (H) 2009     Past Surgical History:   Procedure Laterality Date     CV CORONARY ANGIOGRAM N/A 3/19/2018    Procedure: Coronary Angiogram;  Surgeon: Jesus Sepulveda MD;  Location: [facility];  Service:      No family history on file.     Social History     Socioeconomic History     Marital status:      Spouse name: Not on file     Number of children: Not on file     Years of education: Not on file     Highest education level: Not on file   Occupational History     Not on file   Tobacco Use     Smoking status: Never Smoker     Smokeless tobacco: Never Used   Substance and Sexual Activity     Alcohol use: No     Drug use: No     Sexual activity: Not on file   Other Topics Concern     Not on file   Social History Narrative     Not on file     Social Determinants of Health     Financial Resource Strain: Not on file   Food Insecurity: Not on file   Transportation Needs: Not on file   Physical Activity: Not on file   Stress: Not on file   Social Connections: Not on file   Intimate Partner Violence: Not on file   Housing Stability: Not on file           Medications  Allergies   Current Outpatient Medications   Medication Sig Dispense Refill     acetaminophen (TYLENOL) 325 MG tablet Take 650 mg by mouth every 8 hours as needed        aspirin 325 MG tablet [ASPIRIN 325 MG TABLET] Take 325 mg by mouth daily.       coQ10, ubiquinol, 200 mg cap [COQ10, UBIQUINOL, 200 MG CAP] Take 600 mg by mouth daily.       cyclobenzaprine (FLEXERIL) 10 MG tablet [CYCLOBENZAPRINE (FLEXERIL) 10 MG TABLET] Take 10 mg by mouth 3 (three) times a day as needed.  0     ezetimibe (ZETIA) 10 MG tablet Take 1 tablet (10 mg) daily. 90 tablet 0     lisinopril (ZESTRIL) 5 MG tablet Take 1 tablet (5 mg) by mouth daily 90 tablet 0     metFORMIN (GLUCOPHAGE-XR) 500 MG 24 hr tablet [METFORMIN (GLUCOPHAGE-XR) 500 MG 24 HR TABLET] Take 1,000 mg by mouth 2 (two) times a day.       metoprolol succinate ER (TOPROL  XL) 50 MG 24 hr tablet Take 1 tablet (50 mg) by mouth daily 90 tablet 0     MICROLET LANCET [MICROLET LANCET]        Multiple Vitamin (MULTIVITAMIN ADULT PO) Take 1 tablet by mouth daily       nitroglycerin (NITROSTAT) 0.3 MG SL tablet [NITROGLYCERIN (NITROSTAT) 0.3 MG SL TABLET] Place 1 tablet (0.3 mg total) under the tongue every 5 (five) minutes as needed for chest pain. 1 Bottle 0     pravastatin (PRAVACHOL) 80 MG tablet Take 1 tablet (80 mg) by mouth At Bedtime 90 tablet 0     No Known Allergies       Lab Results    Chemistry/lipid CBC Cardiac Enzymes/BNP/TSH/INR   Recent Labs   Lab Test 07/19/22  0903   CHOL 139   HDL 40   LDL 57   TRIG 212*     Recent Labs   Lab Test 07/19/22  0903 02/17/22  0815 04/14/21  0845   LDL 57 66 69     Recent Labs   Lab Test 07/19/22  0903      POTASSIUM 4.5   CHLORIDE 104   CO2 25   *   BUN 16.1   CR 0.60*   GFRESTIMATED >90   CHARLENE 9.1     Recent Labs   Lab Test 07/19/22  0903 02/17/22  0815 06/04/21  0836   CR 0.60* 0.66* 0.71     Recent Labs   Lab Test 02/17/22  0815 03/16/20  0921   A1C 6.5* 13.0*          Recent Labs   Lab Test 03/21/18  0514 03/20/18 0442 03/19/18 2050 03/19/18 1814   WBC  --   --   --  6.7   HGB  --  13.5*   < > 15.5   HCT  --   --   --  46.1   MCV  --   --   --  92     --   --  224    < > = values in this interval not displayed.     Recent Labs   Lab Test 03/20/18 0442 03/19/18 2050 03/19/18 1814   HGB 13.5* 13.6* 15.5    Recent Labs   Lab Test 03/19/18 1814   TROPONINI <0.01     No results for input(s): BNP, NTBNPI, NTBNP in the last 10455 hours.  No results for input(s): TSH in the last 37857 hours.  Recent Labs   Lab Test 03/19/18 1814   INR 0.99        Soren Joseph MD                Thank you for allowing me to participate in the care of your patient.      Sincerely,     Soren Joseph MD     [facility]  cc:   Soren Joseph MD  [address] [place] [address]  [address]  [place] [address]

## 2022-09-01 NOTE — PROGRESS NOTES
HEART CARE ENCOUNTER CONSULTATON NOTE      [facility]  [phone]      Assessment/Recommendations   Assessment/Plan:  1.  Coronary artery disease with prior bypass surgery and acute myocardial infarction in March 2018 with intervention to the native right coronary artery as outlined below.  Symptom presentation was primarily heartburn sensation.  He has had no symptoms that are reminiscent of that presentation.  He does have a job that requires him to be active and I have recommended that we pursue follow-up stress testing.  Stress echocardiogram April 2021 was negative for ischemia at a significantly blunted heart rate.  He was able to exercise 7 minutes and 5 seconds.    2.  Hypertension.  Blood pressure appears to be under good control based on blood pressure readings today.  He does follow-up closely with his primary care provider as well.  I do not believe I have the most recent notes from his primary care provider there is a note from August 2021 at which time blood pressure was  as 118/80     3.  Dyslipidemia.  Most recent lipids are reviewed from July 2022.  This was completed at the request of his primary care provider.  His total cholesterol 139, triglycerides 212, HDL of 40 with an LDL of 57.  I did review the mild elevation in triglycerides and the importance of diet and ambulation and ongoing blood sugar control    Plan exercise stress echocardiogram within the next few weeks  Follow-up 6 months             History of Present Illness/Subjective    HPI: Hari Montgomery is a 61 year old male who is seeen in follow up.He has a history of coronary artery bypass grafting with acute myocardial infarction March 2018.  Coronary angiogram demonstrated occlusion of the LAD in its mid vessel, occlusion of the distal right coronary artery, occlusion of the circumflex, patent graft to the LAD, patent graft to the obtuse marginal, patent vein graft to a diagonal that was ectatic.  The right  internal mammary artery to the right coronary artery was not well visualized with successful stenting to the native right coronary artery.     Stress echocardiogram most recently from April 2021 was nondiagnostic secondary to significant blunting of his heart rate with exercise.  He took his metoprolol that morning per his recall.  Left-ventricular function was said to be normal without significant valve abnormality without signs of ischemia at the heart rate achieved.    He reports feeling well.  He specifically denies chest pain, shortness of breath, exercise intolerance or dizziness.  His job does require him to be quite active.  I have reviewed recent notes and lab data from his primary care provider.    Recent Echocardiogram Results:  Echocardiogram Exercise Stress  Order: [id number]   Status: Final result     Visible to patient: No (inaccessible in MyChart)     Next appt: 09/01/2022 at 08:50 AM in Cardiology (Soren Joseph MD)     Dx: CAD (coronary artery disease)     1 Result Note    Details    Reading Physician Reading Date Result Priority   Walker Gayle MD  [phone] 4/8/2021 Routine   Provider, Historical 4/8/2021      Narrative & Impression    An exercise echocardiogram stress test was performed following a Toni protocol with the patient exercising for 7 minutes and 5 seconds    The patient's exercise tolerance was normal.    Heart rate demonstrated a blunted response to exercise, only achieving 61% of age-predicted maximum heart rate.    The stress electrocardiogram an echocardiogram are non-diagnostic due to failure to achieve target heart rate. No signs of ischemia noted at heart rate achieved.    Left ventricle ejection fraction is normal. The estimated left ventricular ejection fraction is 60%.    No significant valve disease identified on screening echo images.    When compared to the previous study dated 9/21/2018, no significant change.            Recent Coronary Angiogram Results:  Hari  LUIZ Montgomery  Coronary Angiogram  Order# [id number]  Reading physician: Jesus Sepulveda MD Ordering physician: Jesus Sepulveda MD Study date: 03/19/2018       Patient Information    Name MRN Description   Hari Montgomery [id number] 57 year old male                     Conclusion      Estimated blood loss was <20 ml.    The LM vessel was injected.    The LAD vessel was injected .    The ramus vessel was injected.    The left circumflex was injected.    The RCA was injected.    Dist RCA lesion 100% stenosed.     Hari Montgomery is a 57 y.o. old male with CAD s/p ACS and CABG x4 w/ L-LAD, V-D, V-OM, ?WILL-RCA and RCA endarterectomy, obesity, family h/o CAD who is here for inferior STEMI.      Findings:  LM:no obstruction  LAD:occluded in mid-vessel, fills distally via patent L-LAD, w/ mild diffuse disease in distal vessel.   RC: severe diffuse disease in proximal portion, small caliber, similar to prior angio  Lcx:occluded in mid-vessel. Fills distally via patent V-OM  RCA:dominant, occluded in distal vessel     Grafts:  L-LAD: patent  SVG-OM: patent  SVG-D: patent, ectatic distally  WILL-RCA: not visualized on selective, non-selective in-situ, and aortogram injections     Access:  R Femoral artery     PCI:  RCA was engaged w/ a 6F JR4 Guide catheter and the lesion in dRCA was difficult to wire w/ a Forte, so it was instead wired w/ a Turnpike-supported Whisper J wire confirming luminal location via careful injection through the microcatheter. W/ the use of   a trapper balloon the wire was exchanged for a Forte and dRCA was ballooned w/ a 2.0x12mm Emerge balloon and stented w/ a [id number] Synergy EES at 12 tk. Final angiography showed no dissection or perforation and a CHIKIS 3 flow.     Closure:   Perclose     Impression/plan:  Pt. is a 57 y.o. old male with CAD s/p ACS and CABG x4 w/ L-LAD, V-D, V-OM, ?WILL-RCA and RCA endarterectomy, obesity, family h/o CAD who is here for inferior STEMI.  - multi-vessel CAD, patent L-LAD,  V-D, V-OM; dRCA s/p endarterectomy and WILL-RCA. Unable to visualize R-RCA, but native RCA is more likely to be the true JEROME. Suspect that WILL may not have matured due to successful endarterectomy  - in either case, now s/p successful PCI to native RCA w/ EESx1 w/ resolution of ST elevations and CP  - ASA 81mg daily indefinitely, ticagrelor 180mg once, followed by 90mg twice daily for at least 12 months  - TTE in am, atorva 80. Hold off on BB/ACEI/nitrates tonight but plan to at least start BB this admission  - continue aggressive risk factor modification            Physical Examination  Review of Systems   Vitals: 112/72, weight 238 pounds, heart rate of 80 and regular  Wt Readings from Last 3 Encounters:   01/17/22 102.5 kg (226 lb)   03/26/21 110.4 kg (243 lb 6.4 oz)   03/16/20 110.7 kg (244 lb)       General Appearance:   no distress, normal body habitus   ENT/Mouth:  Wearing a facemask      EYES:  no scleral icterus, normal conjunctivae   Neck: no carotid bruits or thyromegaly   Chest/Lungs:   lungs are clear to auscultation, no rales or wheezing, well-healed sternal scar, equal chest wall expansion    Cardiovascular:   Regular. Normal first and second heart sounds with no murmurs, rubs, or gallops; the carotid, radial and posterior tibial pulses are intact, Jugular venous pressure within normal limits, no edema bilaterally    Abdomen:  no  bruits, or tenderness; bowel sounds are present   Extremities: no cyanosis or clubbing   Skin: no xanthelasma, warm.    Neurologic:  no tremors     Psychiatric: alert and oriented x3, calm        Please refer above for cardiac ROS details.        Medical History  Surgical History Family History Social History   Past Medical History:   Diagnosis Date     Myocardial infarction (H) 2009     Past Surgical History:   Procedure Laterality Date     CV CORONARY ANGIOGRAM N/A 3/19/2018    Procedure: Coronary Angiogram;  Surgeon: Jesus Sepulveda MD;  Location: [facility];   Service:      No family history on file.     Social History     Socioeconomic History     Marital status:      Spouse name: Not on file     Number of children: Not on file     Years of education: Not on file     Highest education level: Not on file   Occupational History     Not on file   Tobacco Use     Smoking status: Never Smoker     Smokeless tobacco: Never Used   Substance and Sexual Activity     Alcohol use: No     Drug use: No     Sexual activity: Not on file   Other Topics Concern     Not on file   Social History Narrative     Not on file     Social Determinants of Health     Financial Resource Strain: Not on file   Food Insecurity: Not on file   Transportation Needs: Not on file   Physical Activity: Not on file   Stress: Not on file   Social Connections: Not on file   Intimate Partner Violence: Not on file   Housing Stability: Not on file           Medications  Allergies   Current Outpatient Medications   Medication Sig Dispense Refill     acetaminophen (TYLENOL) 325 MG tablet Take 650 mg by mouth every 8 hours as needed        aspirin 325 MG tablet [ASPIRIN 325 MG TABLET] Take 325 mg by mouth daily.       coQ10, ubiquinol, 200 mg cap [COQ10, UBIQUINOL, 200 MG CAP] Take 600 mg by mouth daily.       cyclobenzaprine (FLEXERIL) 10 MG tablet [CYCLOBENZAPRINE (FLEXERIL) 10 MG TABLET] Take 10 mg by mouth 3 (three) times a day as needed.  0     ezetimibe (ZETIA) 10 MG tablet Take 1 tablet (10 mg) daily. 90 tablet 0     lisinopril (ZESTRIL) 5 MG tablet Take 1 tablet (5 mg) by mouth daily 90 tablet 0     metFORMIN (GLUCOPHAGE-XR) 500 MG 24 hr tablet [METFORMIN (GLUCOPHAGE-XR) 500 MG 24 HR TABLET] Take 1,000 mg by mouth 2 (two) times a day.       metoprolol succinate ER (TOPROL XL) 50 MG 24 hr tablet Take 1 tablet (50 mg) by mouth daily 90 tablet 0     MICROLET LANCET [MICROLET LANCET]        Multiple Vitamin (MULTIVITAMIN ADULT PO) Take 1 tablet by mouth daily       nitroglycerin (NITROSTAT) 0.3 MG SL tablet  [NITROGLYCERIN (NITROSTAT) 0.3 MG SL TABLET] Place 1 tablet (0.3 mg total) under the tongue every 5 (five) minutes as needed for chest pain. 1 Bottle 0     pravastatin (PRAVACHOL) 80 MG tablet Take 1 tablet (80 mg) by mouth At Bedtime 90 tablet 0     No Known Allergies       Lab Results    Chemistry/lipid CBC Cardiac Enzymes/BNP/TSH/INR   Recent Labs   Lab Test 07/19/22  0903   CHOL 139   HDL 40   LDL 57   TRIG 212*     Recent Labs   Lab Test 07/19/22  0903 02/17/22  0815 04/14/21  0845   LDL 57 66 69     Recent Labs   Lab Test 07/19/22  0903      POTASSIUM 4.5   CHLORIDE 104   CO2 25   *   BUN 16.1   CR 0.60*   GFRESTIMATED >90   CHARLENE 9.1     Recent Labs   Lab Test 07/19/22  0903 02/17/22  0815 06/04/21  0836   CR 0.60* 0.66* 0.71     Recent Labs   Lab Test 02/17/22  0815 03/16/20  0921   A1C 6.5* 13.0*          Recent Labs   Lab Test 03/21/18  0514 03/20/18 0442 03/19/18 2050 03/19/18  1814   WBC  --   --   --  6.7   HGB  --  13.5*   < > 15.5   HCT  --   --   --  46.1   MCV  --   --   --  92     --   --  224    < > = values in this interval not displayed.     Recent Labs   Lab Test 03/20/18 0442 03/19/18 2050 03/19/18 1814   HGB 13.5* 13.6* 15.5    Recent Labs   Lab Test 03/19/18 1814   TROPONINI <0.01     No results for input(s): BNP, NTBNPI, NTBNP in the last 01090 hours.  No results for input(s): TSH in the last 93437 hours.  Recent Labs   Lab Test 03/19/18 1814   INR 0.99        Soren Joseph MD

## 2022-09-01 NOTE — PATIENT INSTRUCTIONS
We are going to plan a stress echocardiogram.Please recall to avoid nitroglycerin within 24 hours of taking Viagra.Please call my nurse Luma with any heart related questions.Her number is [phone].I will be in touch with results.

## 2022-09-15 NOTE — PATIENT INSTRUCTIONS
Please call my nurse Luma with any heart related questions.Her number is [phone].Please call with any symptoms or concerns. Please call if you need refills of any heart medicines.  
normal...
There are no Wet Read(s) to document.

## 2022-09-20 ENCOUNTER — HOSPITAL ENCOUNTER (OUTPATIENT)
Dept: CARDIOLOGY | Facility: HOSPITAL | Age: 62
Discharge: HOME | End: 2022-09-20
Attending: INTERNAL MEDICINE | Admitting: INTERNAL MEDICINE
Payer: COMMERCIAL

## 2022-09-20 DIAGNOSIS — I25.709 CORONARY ARTERY DISEASE INVOLVING CORONARY BYPASS GRAFT OF NATIVE HEART WITH ANGINA PECTORIS (H): ICD-10-CM

## 2022-09-20 PROCEDURE — 93352 ADMIN ECG CONTRAST AGENT: CPT | Performed by: INTERNAL MEDICINE

## 2022-09-20 PROCEDURE — 93018 CV STRESS TEST I&R ONLY: CPT | Performed by: INTERNAL MEDICINE

## 2022-09-20 PROCEDURE — C8928 TTE W OR W/O FOL W/CON,STRES: HCPCS

## 2022-09-20 PROCEDURE — 93325 DOPPLER ECHO COLOR FLOW MAPG: CPT | Mod: 26 | Performed by: INTERNAL MEDICINE

## 2022-09-20 PROCEDURE — 93321 DOPPLER ECHO F-UP/LMTD STD: CPT | Mod: TC

## 2022-09-20 PROCEDURE — 93350 STRESS TTE ONLY: CPT | Mod: 26 | Performed by: INTERNAL MEDICINE

## 2022-09-20 PROCEDURE — 93016 CV STRESS TEST SUPVJ ONLY: CPT | Performed by: INTERNAL MEDICINE

## 2022-09-20 PROCEDURE — 93321 DOPPLER ECHO F-UP/LMTD STD: CPT | Mod: 26 | Performed by: INTERNAL MEDICINE

## 2022-09-20 PROCEDURE — 255N000002 HC RX 255 OP 636: Performed by: INTERNAL MEDICINE

## 2022-09-20 RX ADMIN — PERFLUTREN 6 ML: 6.52 INJECTION, SUSPENSION INTRAVENOUS at 09:42

## 2023-03-02 ENCOUNTER — LAB REQUISITION (OUTPATIENT)
Dept: LAB | Facility: CLINIC | Age: 63
End: 2023-03-02

## 2023-03-02 DIAGNOSIS — R30.0 DYSURIA: ICD-10-CM

## 2023-03-02 PROCEDURE — 87086 URINE CULTURE/COLONY COUNT: CPT | Performed by: FAMILY MEDICINE

## 2023-03-04 LAB — BACTERIA UR CULT: ABNORMAL

## 2023-04-14 ENCOUNTER — LAB REQUISITION (OUTPATIENT)
Dept: LAB | Facility: CLINIC | Age: 63
End: 2023-04-14

## 2023-04-14 DIAGNOSIS — E11.9 TYPE 2 DIABETES MELLITUS WITHOUT COMPLICATIONS (H): ICD-10-CM

## 2023-04-14 LAB
ALBUMIN SERPL BCG-MCNC: 4.6 G/DL (ref 3.5–5.2)
ALP SERPL-CCNC: 65 U/L (ref 40–129)
ALT SERPL W P-5'-P-CCNC: 38 U/L (ref 10–50)
ANION GAP SERPL CALCULATED.3IONS-SCNC: 14 MMOL/L (ref 7–15)
AST SERPL W P-5'-P-CCNC: 33 U/L (ref 10–50)
BILIRUB SERPL-MCNC: 0.7 MG/DL
BUN SERPL-MCNC: 18.1 MG/DL (ref 8–23)
CALCIUM SERPL-MCNC: 9.5 MG/DL (ref 8.8–10.2)
CHLORIDE SERPL-SCNC: 102 MMOL/L (ref 98–107)
CHOLEST SERPL-MCNC: 157 MG/DL
CREAT SERPL-MCNC: 0.65 MG/DL (ref 0.67–1.17)
DEPRECATED HCO3 PLAS-SCNC: 23 MMOL/L (ref 22–29)
GFR SERPL CREATININE-BSD FRML MDRD: >90 ML/MIN/1.73M2
GLUCOSE SERPL-MCNC: 157 MG/DL (ref 70–99)
HDLC SERPL-MCNC: 41 MG/DL
LDLC SERPL CALC-MCNC: 73 MG/DL
NONHDLC SERPL-MCNC: 116 MG/DL
POTASSIUM SERPL-SCNC: 4.2 MMOL/L (ref 3.4–5.3)
PROT SERPL-MCNC: 7.6 G/DL (ref 6.4–8.3)
SODIUM SERPL-SCNC: 139 MMOL/L (ref 136–145)
TRIGL SERPL-MCNC: 216 MG/DL

## 2023-04-14 PROCEDURE — 80053 COMPREHEN METABOLIC PANEL: CPT | Performed by: FAMILY MEDICINE

## 2023-04-14 PROCEDURE — 80061 LIPID PANEL: CPT | Performed by: FAMILY MEDICINE

## 2023-08-03 DIAGNOSIS — I21.19 ACUTE MI, INFERIOR WALL, INITIAL EPISODE OF CARE (H): ICD-10-CM

## 2023-08-03 RX ORDER — METOPROLOL SUCCINATE 50 MG/1
50 TABLET, EXTENDED RELEASE ORAL DAILY
Qty: 90 TABLET | Refills: 0 | Status: SHIPPED | OUTPATIENT
Start: 2023-08-03

## 2023-08-03 RX ORDER — LISINOPRIL 5 MG/1
5 TABLET ORAL DAILY
Qty: 90 TABLET | Refills: 0 | Status: SHIPPED | OUTPATIENT
Start: 2023-08-03

## 2023-08-24 ENCOUNTER — LAB REQUISITION (OUTPATIENT)
Dept: LAB | Facility: CLINIC | Age: 63
End: 2023-08-24

## 2023-08-24 DIAGNOSIS — Z12.5 ENCOUNTER FOR SCREENING FOR MALIGNANT NEOPLASM OF PROSTATE: ICD-10-CM

## 2023-08-24 LAB — PSA SERPL DL<=0.01 NG/ML-MCNC: 0.98 NG/ML (ref 0–4.5)

## 2023-08-24 PROCEDURE — G0103 PSA SCREENING: HCPCS | Performed by: FAMILY MEDICINE

## 2024-04-15 ENCOUNTER — LAB REQUISITION (OUTPATIENT)
Dept: LAB | Facility: CLINIC | Age: 64
End: 2024-04-15

## 2024-04-15 DIAGNOSIS — E11.65 TYPE 2 DIABETES MELLITUS WITH HYPERGLYCEMIA (H): ICD-10-CM

## 2024-04-15 PROCEDURE — 82570 ASSAY OF URINE CREATININE: CPT | Performed by: FAMILY MEDICINE

## 2024-04-15 PROCEDURE — 80061 LIPID PANEL: CPT | Performed by: FAMILY MEDICINE

## 2024-04-15 PROCEDURE — 80053 COMPREHEN METABOLIC PANEL: CPT | Performed by: FAMILY MEDICINE

## 2024-04-16 LAB
ALBUMIN SERPL BCG-MCNC: 4.9 G/DL (ref 3.5–5.2)
ALP SERPL-CCNC: 61 U/L (ref 40–150)
ALT SERPL W P-5'-P-CCNC: 44 U/L (ref 0–70)
ANION GAP SERPL CALCULATED.3IONS-SCNC: 12 MMOL/L (ref 7–15)
AST SERPL W P-5'-P-CCNC: 33 U/L (ref 0–45)
BILIRUB SERPL-MCNC: 0.8 MG/DL
BUN SERPL-MCNC: 18.9 MG/DL (ref 8–23)
CALCIUM SERPL-MCNC: 9.3 MG/DL (ref 8.8–10.2)
CHLORIDE SERPL-SCNC: 103 MMOL/L (ref 98–107)
CHOLEST SERPL-MCNC: 146 MG/DL
CREAT SERPL-MCNC: 0.61 MG/DL (ref 0.67–1.17)
CREAT UR-MCNC: 151 MG/DL
DEPRECATED HCO3 PLAS-SCNC: 22 MMOL/L (ref 22–29)
EGFRCR SERPLBLD CKD-EPI 2021: >90 ML/MIN/1.73M2
FASTING STATUS PATIENT QL REPORTED: NORMAL
GLUCOSE SERPL-MCNC: 152 MG/DL (ref 70–99)
HDLC SERPL-MCNC: 44 MG/DL
LDLC SERPL CALC-MCNC: 73 MG/DL
MICROALBUMIN UR-MCNC: 85.7 MG/L
MICROALBUMIN/CREAT UR: 56.75 MG/G CR (ref 0–17)
NONHDLC SERPL-MCNC: 102 MG/DL
POTASSIUM SERPL-SCNC: 4.3 MMOL/L (ref 3.4–5.3)
PROT SERPL-MCNC: 7.9 G/DL (ref 6.4–8.3)
SODIUM SERPL-SCNC: 137 MMOL/L (ref 135–145)
TRIGL SERPL-MCNC: 143 MG/DL

## 2024-12-10 ENCOUNTER — LAB REQUISITION (OUTPATIENT)
Dept: LAB | Facility: CLINIC | Age: 64
End: 2024-12-10

## 2024-12-10 DIAGNOSIS — E11.65 TYPE 2 DIABETES MELLITUS WITH HYPERGLYCEMIA (H): ICD-10-CM

## 2024-12-10 DIAGNOSIS — Z12.5 ENCOUNTER FOR SCREENING FOR MALIGNANT NEOPLASM OF PROSTATE: ICD-10-CM

## 2024-12-10 PROCEDURE — 82435 ASSAY OF BLOOD CHLORIDE: CPT | Performed by: FAMILY MEDICINE

## 2024-12-10 PROCEDURE — 82607 VITAMIN B-12: CPT | Performed by: FAMILY MEDICINE

## 2024-12-10 PROCEDURE — G0103 PSA SCREENING: HCPCS | Performed by: FAMILY MEDICINE

## 2024-12-10 PROCEDURE — 80048 BASIC METABOLIC PNL TOTAL CA: CPT | Performed by: FAMILY MEDICINE

## 2024-12-11 LAB
ANION GAP SERPL CALCULATED.3IONS-SCNC: 15 MMOL/L (ref 7–15)
BUN SERPL-MCNC: 19.6 MG/DL (ref 8–23)
CALCIUM SERPL-MCNC: 9.3 MG/DL (ref 8.8–10.4)
CHLORIDE SERPL-SCNC: 102 MMOL/L (ref 98–107)
CREAT SERPL-MCNC: 0.51 MG/DL (ref 0.67–1.17)
EGFRCR SERPLBLD CKD-EPI 2021: >90 ML/MIN/1.73M2
GLUCOSE SERPL-MCNC: 118 MG/DL (ref 70–99)
HCO3 SERPL-SCNC: 20 MMOL/L (ref 22–29)
POTASSIUM SERPL-SCNC: 4.4 MMOL/L (ref 3.4–5.3)
PSA SERPL DL<=0.01 NG/ML-MCNC: 0.37 NG/ML (ref 0–4.5)
SODIUM SERPL-SCNC: 137 MMOL/L (ref 135–145)
VIT B12 SERPL-MCNC: 1069 PG/ML (ref 232–1245)

## 2025-04-23 ENCOUNTER — MEDICAL CORRESPONDENCE (OUTPATIENT)
Dept: HEALTH INFORMATION MANAGEMENT | Facility: CLINIC | Age: 65
End: 2025-04-23
Payer: COMMERCIAL

## 2025-04-23 ENCOUNTER — TRANSFERRED RECORDS (OUTPATIENT)
Dept: HEALTH INFORMATION MANAGEMENT | Facility: CLINIC | Age: 65
End: 2025-04-23
Payer: COMMERCIAL

## 2025-05-12 NOTE — PROGRESS NOTES
HEART CARE OUT-PATIENT CONSULTATON NOTE      [facility]  [phone]      Assessment/Recommendations   Assessment: 64 year old male with CAD    Plan:  CAD  Doing after CABG 2009 and PCI 2018      -Continue ASA (can decrease to 81mg), Toprol 50mg       -Continue Pravachol 80mg and Zetia 10mg, see below       -Return to clinic 1 year     Atrial fibrillation, paroxysmal   Post-op 2009, none since, [id number] = 2      -Continue Toprol 50mg    HTN  Well controlled      -Continue Toprol 50mg, Lisinopril 5mg    Hyperlipidemia   LDL = 73 on low intensity statin, had intolerable side effect (muscle and joint pain) with Crestor        -Continue Pravachol 80mg and Zetia 10mg      -If LDL rises in future can add PCSK-9      The longitudinal plan of care for the diagnosis(es)/condition(s) as documented were addressed during this visit. Due to the added complexity in care, I will continue to support Hari in the subsequent management and with ongoing continuity of care.          History of Present Illness/Subjective    Indication for Consult:  I was asked to see Hari Montgomery by Dr Jessica Barahona for CAD, previously followed with Dr Joseph      HPI: Hari Montgomery is a 64 year old male with a history of CAD, HTN, hyperlipidemia, paroxysmal atrial fibrillation who presents for evaluation.    He reports he feels pretty good, no chest pain, dyspnea, orthopnea, PND.  Having some orthopedic issues, but no heart concerns.      I reviewed notes from PCP prior to this visit.         Physical Examination  Past Cardiac History   Vitals: /75 (BP Location: Right arm, Patient Position: Sitting, Cuff Size: Adult Large)   Pulse 57   Resp 14   Wt 105.7 kg (233 lb)   BMI 35.43 kg/m    BMI= Body mass index is 35.43 kg/m .  Wt Readings from Last 3 Encounters:   05/19/25 105.7 kg (233 lb)   09/01/22 108.2 kg (238 lb 8 oz)   01/17/22 102.5 kg (226 lb)       General Appearance:   no distress, normal body habitus    ENT/Mouth: membranes moist, no oral lesions or bleeding gums.      EYES:  no scleral icterus, normal conjunctivae   Neck: no carotid bruits or thyromegaly   Chest/Lungs:   lungs are clear to auscultation, no rales or wheezing,  sternal scar, equal chest wall expansion    Cardiovascular:   Regular. Normal first and second heart sounds with no murmurs, rubs, or gallops; the carotid, radial and posterior tibial pulses are intact, Jugular venous pressure normal , no edema bilaterally    Abdomen:  no organomegaly, masses, bruits, or tenderness; bowel sounds are present   Extremities: no cyanosis or clubbing   Skin: no xanthelasma, warm.    Neurologic: normal  bilateral, no tremors           CAD:   *s/p CABG 2009 with LIMA to LAD, WILL to RCA (atretic at follow up), SVGs to D1 and OM1  *S/p ZHEN to RCA 2018    2. Atrial fibrillation, paroxysmal post-op CABG     Most Recent Echocardiogram: 9/20/2022  Normal resting LV systolic performance with an ejection fraction of 55-60%.    Most Recent Stress Test: 9/20/2022  1. Normal stress echocardiogram without evidence of stress induced ischemia,  but with reduced sensitivity due to not attaining target heart rate and lower  double product achieved (patient achieved 73% predicted maximum heart rate for  age).  2. Normal resting LV systolic performance with an ejection fraction of 55-60%.  There is normal improvement in left ventricular systolic performance with a  peak ejection fraction of 70%.  3. No ECG evidence of ischemia.  4. No anginal chest pain reported with exercise.  5. Average functional capacity for age.    Most Recent Angiogram: 3/19/2018  LM:no obstruction   LAD:occluded in mid-vessel, fills distally via patent L-LAD, w/ mild   diffuse disease in distal vessel.   RC: severe diffuse disease in proximal portion, small caliber, similar to   prior angio   Lcx:occluded in mid-vessel. Fills distally via patent V-OM   RCA:dominant, occluded in distal vessel               Medical History  Family History Social History   Past Medical History:   Diagnosis Date    Myocardial infarction (H) 2009     Grandpa CABG x 4 at 59  Father had CABG x 6 at 66, 2nd MI with PCI 69  Uncle MI with PCI  Aunt MI/PCI  Cousin MI/PCI  Cousin CABG x 4       Social History     Socioeconomic History    Marital status:      Spouse name: Not on file    Number of children: Not on file    Years of education: Not on file    Highest education level: Not on file   Occupational History    Not on file   Tobacco Use    Smoking status: Never    Smokeless tobacco: Never   Substance and Sexual Activity    Alcohol use: No    Drug use: No    Sexual activity: Not on file   Other Topics Concern    Not on file   Social History Narrative    Not on file     Social Drivers of Health     Financial Resource Strain: Not on file   Food Insecurity: Not on file   Transportation Needs: Not on file   Physical Activity: Not on file   Stress: Not on file   Social Connections: Not on file   Interpersonal Safety: Not on file   Housing Stability: Not on file           Medications  Allergies   Current Outpatient Medications   Medication Sig Dispense Refill    ACCU-CHEK GUIDE TEST test strip USE AS DIRECTED 3 TIMES DAILY. 90      acetaminophen (TYLENOL) 325 MG tablet Take 650 mg by mouth every 8 hours as needed       aspirin 325 MG tablet [ASPIRIN 325 MG TABLET] Take 325 mg by mouth daily.      coQ10, ubiquinol, 200 mg cap [COQ10, UBIQUINOL, 200 MG CAP] Take 600 mg by mouth daily.      cyclobenzaprine (FLEXERIL) 10 MG tablet [CYCLOBENZAPRINE (FLEXERIL) 10 MG TABLET] Take 10 mg by mouth 3 (three) times a day as needed.  0    ezetimibe (ZETIA) 10 MG tablet Take 1 tablet (10 mg) daily. 90 tablet 0    JARDIANCE 25 MG TABS tablet Take 25 mg by mouth daily.      lisinopril (ZESTRIL) 5 MG tablet Take 1 tablet (5 mg) by mouth daily 90 tablet 0    metFORMIN (GLUCOPHAGE-XR) 500 MG 24 hr tablet [METFORMIN (GLUCOPHAGE-XR) 500 MG 24 HR TABLET]  "Take 1,000 mg by mouth 2 (two) times a day.      metoprolol succinate ER (TOPROL XL) 50 MG 24 hr tablet Take 1 tablet (50 mg) by mouth daily 90 tablet 0    MICROLET LANCET [MICROLET LANCET]       Multiple Vitamin (MULTIVITAMIN ADULT PO) Take 1 tablet by mouth daily      nitroglycerin (NITROSTAT) 0.3 MG SL tablet [NITROGLYCERIN (NITROSTAT) 0.3 MG SL TABLET] Place 1 tablet (0.3 mg total) under the tongue every 5 (five) minutes as needed for chest pain. 1 Bottle 0    pravastatin (PRAVACHOL) 80 MG tablet Take 1 tablet (80 mg) by mouth At Bedtime 90 tablet 0    sildenafil (VIAGRA) 100 MG tablet TAKE 1/2 TO 1 TAB ORALLY ONCE A DAY AS NEEDED       No Known Allergies       Lab Results    Chemistry/lipid CBC Cardiac Enzymes/BNP/TSH/INR   Recent Labs   Lab Test 04/15/24  0932   CHOL 146   HDL 44   LDL 73   TRIG 143     Recent Labs   Lab Test 04/15/24  0932 04/14/23  0937 07/19/22  0903   LDL 73 73 57     Recent Labs   Lab Test 12/10/24  1013      POTASSIUM 4.4   CHLORIDE 102   CO2 20*   *   BUN 19.6   CR 0.51*   GFRESTIMATED >90   CHARLENE 9.3     Recent Labs   Lab Test 12/10/24  1013 04/15/24  0932 04/14/23  0937   CR 0.51* 0.61* 0.65*     Recent Labs   Lab Test 02/17/22  0815 03/16/20  0921   A1C 6.5* 13.0*          Recent Labs   Lab Test 03/21/18  0514 03/20/18 0442 03/19/18 2050 03/19/18  1814   WBC  --   --   --  6.7   HGB  --  13.5*   < > 15.5   HCT  --   --   --  46.1   MCV  --   --   --  92     --   --  224    < > = values in this interval not displayed.     Recent Labs   Lab Test 03/20/18 0442 03/19/18 2050 03/19/18  1814   HGB 13.5* 13.6* 15.5    Recent Labs   Lab Test 03/19/18  1814   TROPONINI <0.01     No results for input(s): \"BNP\", \"NTBNPI\", \"NTBNP\" in the last 53209 hours.  No results for input(s): \"TSH\" in the last 07952 hours.  Recent Labs   Lab Test 03/19/18  1814   INR 0.99        Valeri Soliz MD  Noninvasive Cardiologist   [facility]                                  "

## 2025-05-19 ENCOUNTER — OFFICE VISIT (OUTPATIENT)
Dept: CARDIOLOGY | Facility: CLINIC | Age: 65
End: 2025-05-19
Payer: COMMERCIAL

## 2025-05-19 VITALS
DIASTOLIC BLOOD PRESSURE: 75 MMHG | HEART RATE: 57 BPM | BODY MASS INDEX: 35.43 KG/M2 | RESPIRATION RATE: 14 BRPM | SYSTOLIC BLOOD PRESSURE: 111 MMHG | WEIGHT: 233 LBS

## 2025-05-19 DIAGNOSIS — I25.10 CORONARY ARTERY DISEASE INVOLVING NATIVE CORONARY ARTERY OF NATIVE HEART WITHOUT ANGINA PECTORIS: Primary | ICD-10-CM

## 2025-05-19 DIAGNOSIS — E78.5 HYPERLIPIDEMIA LDL GOAL <70: ICD-10-CM

## 2025-05-19 DIAGNOSIS — I10 PRIMARY HYPERTENSION: ICD-10-CM

## 2025-05-19 DIAGNOSIS — I48.0 PAROXYSMAL ATRIAL FIBRILLATION (H): ICD-10-CM

## 2025-05-19 PROCEDURE — 99214 OFFICE O/P EST MOD 30 MIN: CPT | Performed by: INTERNAL MEDICINE

## 2025-05-19 PROCEDURE — 3078F DIAST BP <80 MM HG: CPT | Performed by: INTERNAL MEDICINE

## 2025-05-19 PROCEDURE — 3074F SYST BP LT 130 MM HG: CPT | Performed by: INTERNAL MEDICINE

## 2025-05-19 PROCEDURE — G2211 COMPLEX E/M VISIT ADD ON: HCPCS | Performed by: INTERNAL MEDICINE

## 2025-05-19 RX ORDER — BLOOD SUGAR DIAGNOSTIC
STRIP MISCELLANEOUS
COMMUNITY
Start: 2025-02-19

## 2025-05-19 RX ORDER — EMPAGLIFLOZIN 25 MG/1
25 TABLET, FILM COATED ORAL DAILY
COMMUNITY

## 2025-05-19 NOTE — PATIENT INSTRUCTIONS
I am happy to meet you and glad you are feeling well.    No testing or medication changes needed.    Follow-up with cardiology in 1 year.

## 2025-05-19 NOTE — LETTER
5/19/2025    Shane Mansfield MD  [address]  [facility] [address]    RE: Hari Montgomery       Dear Colleague,     [facility] the [facility].    HEART CARE OUT-PATIENT CONSULTATON NOTE      [facility]  [phone]      Assessment/Recommendations   Assessment: 64 year old male with CAD    Plan:  CAD  Doing after CABG 2009 and PCI 2018      -Continue ASA (can decrease to 81mg), Toprol 50mg       -Continue Pravachol 80mg and Zetia 10mg, see below       -Return to clinic 1 year     Atrial fibrillation, paroxysmal   Post-op 2009, none since, [id number] = 2      -Continue Toprol 50mg    HTN  Well controlled      -Continue Toprol 50mg, Lisinopril 5mg    Hyperlipidemia   LDL = 73 on low intensity statin, had intolerable side effect (muscle and joint pain) with Crestor        -Continue Pravachol 80mg and Zetia 10mg      -If LDL rises in future can add PCSK-9      The longitudinal plan of care for the diagnosis(es)/condition(s) as documented were addressed during this visit. Due to the added complexity in care, I will continue to support Hari in the subsequent management and with ongoing continuity of care.          History of Present Illness/Subjective    Indication for Consult:  I was asked to see Hari Montgomery by Dr Jessica Barahona for CAD, previously followed with Dr Joseph      HPI: Hari Montgomery is a 64 year old male with a history of CAD, HTN, hyperlipidemia, paroxysmal atrial fibrillation who presents for evaluation.    He reports he feels pretty good, no chest pain, dyspnea, orthopnea, PND.  Having some orthopedic issues, but no heart concerns.      I reviewed notes from PCP prior to this visit.         Physical Examination  Past Cardiac History   Vitals: /75 (BP Location: Right arm, Patient Position: Sitting, Cuff Size: Adult Large)   Pulse 57   Resp 14   Wt 105.7 kg (233 lb)   BMI 35.43 kg/m    BMI= Body mass index is 35.43  kg/m .  Wt Readings from Last 3 Encounters:   05/19/25 105.7 kg (233 lb)   09/01/22 108.2 kg (238 lb 8 oz)   01/17/22 102.5 kg (226 lb)       General Appearance:   no distress, normal body habitus   ENT/Mouth: membranes moist, no oral lesions or bleeding gums.      EYES:  no scleral icterus, normal conjunctivae   Neck: no carotid bruits or thyromegaly   Chest/Lungs:   lungs are clear to auscultation, no rales or wheezing,  sternal scar, equal chest wall expansion    Cardiovascular:   Regular. Normal first and second heart sounds with no murmurs, rubs, or gallops; the carotid, radial and posterior tibial pulses are intact, Jugular venous pressure normal , no edema bilaterally    Abdomen:  no organomegaly, masses, bruits, or tenderness; bowel sounds are present   Extremities: no cyanosis or clubbing   Skin: no xanthelasma, warm.    Neurologic: normal  bilateral, no tremors           CAD:   *s/p CABG 2009 with LIMA to LAD, WILL to RCA (atretic at follow up), SVGs to D1 and OM1  *S/p ZHEN to RCA 2018    2. Atrial fibrillation, paroxysmal post-op CABG     Most Recent Echocardiogram: 9/20/2022  Normal resting LV systolic performance with an ejection fraction of 55-60%.    Most Recent Stress Test: 9/20/2022  1. Normal stress echocardiogram without evidence of stress induced ischemia,  but with reduced sensitivity due to not attaining target heart rate and lower  double product achieved (patient achieved 73% predicted maximum heart rate for  age).  2. Normal resting LV systolic performance with an ejection fraction of 55-60%.  There is normal improvement in left ventricular systolic performance with a  peak ejection fraction of 70%.  3. No ECG evidence of ischemia.  4. No anginal chest pain reported with exercise.  5. Average functional capacity for age.    Most Recent Angiogram: 3/19/2018  LM:no obstruction   LAD:occluded in mid-vessel, fills distally via patent L-LAD, w/ mild   diffuse disease in distal vessel.   RC:  severe diffuse disease in proximal portion, small caliber, similar to   prior angio   Lcx:occluded in mid-vessel. Fills distally via patent V-OM   RCA:dominant, occluded in distal vessel              Medical History  Family History Social History   Past Medical History:   Diagnosis Date     Myocardial infarction (H) 2009     Grandpa CABG x 4 at 59  Father had CABG x 6 at 66, 2nd MI with PCI 69  Uncle MI with PCI  Aunt MI/PCI  Cousin MI/PCI  Cousin CABG x 4       Social History     Socioeconomic History     Marital status:      Spouse name: Not on file     Number of children: Not on file     Years of education: Not on file     Highest education level: Not on file   Occupational History     Not on file   Tobacco Use     Smoking status: Never     Smokeless tobacco: Never   Substance and Sexual Activity     Alcohol use: No     Drug use: No     Sexual activity: Not on file   Other Topics Concern     Not on file   Social History Narrative     Not on file     Social Drivers of Health     Financial Resource Strain: Not on file   Food Insecurity: Not on file   Transportation Needs: Not on file   Physical Activity: Not on file   Stress: Not on file   Social Connections: Not on file   Interpersonal Safety: Not on file   Housing Stability: Not on file           Medications  Allergies   Current Outpatient Medications   Medication Sig Dispense Refill     ACCU-CHEK GUIDE TEST test strip USE AS DIRECTED 3 TIMES DAILY. 90       acetaminophen (TYLENOL) 325 MG tablet Take 650 mg by mouth every 8 hours as needed        aspirin 325 MG tablet [ASPIRIN 325 MG TABLET] Take 325 mg by mouth daily.       coQ10, ubiquinol, 200 mg cap [COQ10, UBIQUINOL, 200 MG CAP] Take 600 mg by mouth daily.       cyclobenzaprine (FLEXERIL) 10 MG tablet [CYCLOBENZAPRINE (FLEXERIL) 10 MG TABLET] Take 10 mg by mouth 3 (three) times a day as needed.  0     ezetimibe (ZETIA) 10 MG tablet Take 1 tablet (10 mg) daily. 90 tablet 0     JARDIANCE 25 MG TABS  "tablet Take 25 mg by mouth daily.       lisinopril (ZESTRIL) 5 MG tablet Take 1 tablet (5 mg) by mouth daily 90 tablet 0     metFORMIN (GLUCOPHAGE-XR) 500 MG 24 hr tablet [METFORMIN (GLUCOPHAGE-XR) 500 MG 24 HR TABLET] Take 1,000 mg by mouth 2 (two) times a day.       metoprolol succinate ER (TOPROL XL) 50 MG 24 hr tablet Take 1 tablet (50 mg) by mouth daily 90 tablet 0     MICROLET LANCET [MICROLET LANCET]        Multiple Vitamin (MULTIVITAMIN ADULT PO) Take 1 tablet by mouth daily       nitroglycerin (NITROSTAT) 0.3 MG SL tablet [NITROGLYCERIN (NITROSTAT) 0.3 MG SL TABLET] Place 1 tablet (0.3 mg total) under the tongue every 5 (five) minutes as needed for chest pain. 1 Bottle 0     pravastatin (PRAVACHOL) 80 MG tablet Take 1 tablet (80 mg) by mouth At Bedtime 90 tablet 0     sildenafil (VIAGRA) 100 MG tablet TAKE 1/2 TO 1 TAB ORALLY ONCE A DAY AS NEEDED       No Known Allergies       Lab Results    Chemistry/lipid CBC Cardiac Enzymes/BNP/TSH/INR   Recent Labs   Lab Test 04/15/24  0932   CHOL 146   HDL 44   LDL 73   TRIG 143     Recent Labs   Lab Test 04/15/24  0932 04/14/23  0937 07/19/22  0903   LDL 73 73 57     Recent Labs   Lab Test 12/10/24  1013      POTASSIUM 4.4   CHLORIDE 102   CO2 20*   *   BUN 19.6   CR 0.51*   GFRESTIMATED >90   CHARLENE 9.3     Recent Labs   Lab Test 12/10/24  1013 04/15/24  0932 04/14/23  0937   CR 0.51* 0.61* 0.65*     Recent Labs   Lab Test 02/17/22  0815 03/16/20  0921   A1C 6.5* 13.0*          Recent Labs   Lab Test 03/21/18  0514 03/20/18  0442 03/19/18  2050 03/19/18  1814   WBC  --   --   --  6.7   HGB  --  13.5*   < > 15.5   HCT  --   --   --  46.1   MCV  --   --   --  92     --   --  224    < > = values in this interval not displayed.     Recent Labs   Lab Test 03/20/18  0442 03/19/18 2050 03/19/18  1814   HGB 13.5* 13.6* 15.5    Recent Labs   Lab Test 03/19/18  1814   TROPONINI <0.01     No results for input(s): \"BNP\", \"NTBNPI\", \"NTBNP\" in the last 88127 " "hours.  No results for input(s): \"TSH\" in the last 15055 hours.  Recent Labs   Lab Test 03/19/18  1814   INR 0.99        Valeri Soliz MD  Noninvasive Cardiologist   [facility]                                      Thank you for allowing me to participate in the care of your patient.      Sincerely,     Valeri Soliz MD     [facility]  cc:   Jessica Merino MD  [address] [address]      "